# Patient Record
Sex: FEMALE | Race: OTHER | HISPANIC OR LATINO | Employment: OTHER | URBAN - METROPOLITAN AREA
[De-identification: names, ages, dates, MRNs, and addresses within clinical notes are randomized per-mention and may not be internally consistent; named-entity substitution may affect disease eponyms.]

---

## 2017-05-04 ENCOUNTER — APPOINTMENT (OUTPATIENT)
Dept: ADMISSIONS | Facility: MEDICAL CENTER | Age: 72
End: 2017-05-04
Attending: NEUROLOGICAL SURGERY
Payer: MEDICARE

## 2017-05-04 RX ORDER — ATORVASTATIN CALCIUM 10 MG/1
10 TABLET, FILM COATED ORAL NIGHTLY
COMMUNITY

## 2017-05-09 ENCOUNTER — HOSPITAL ENCOUNTER (OUTPATIENT)
Facility: MEDICAL CENTER | Age: 72
End: 2017-05-10
Attending: NEUROLOGICAL SURGERY | Admitting: NEUROLOGICAL SURGERY
Payer: MEDICARE

## 2017-05-09 ENCOUNTER — APPOINTMENT (OUTPATIENT)
Dept: RADIOLOGY | Facility: MEDICAL CENTER | Age: 72
End: 2017-05-09
Attending: NEUROLOGICAL SURGERY
Payer: MEDICARE

## 2017-05-09 PROBLEM — M51.36 DEGENERATION OF LUMBAR INTERVERTEBRAL DISC: Status: ACTIVE | Noted: 2017-05-09

## 2017-05-09 PROCEDURE — 700101 HCHG RX REV CODE 250

## 2017-05-09 PROCEDURE — 160009 HCHG ANES TIME/MIN: Performed by: NEUROLOGICAL SURGERY

## 2017-05-09 PROCEDURE — 700112 HCHG RX REV CODE 229: Performed by: PHYSICIAN ASSISTANT

## 2017-05-09 PROCEDURE — 501838 HCHG SUTURE GENERAL: Performed by: NEUROLOGICAL SURGERY

## 2017-05-09 PROCEDURE — 700101 HCHG RX REV CODE 250: Performed by: PHYSICIAN ASSISTANT

## 2017-05-09 PROCEDURE — 700111 HCHG RX REV CODE 636 W/ 250 OVERRIDE (IP)

## 2017-05-09 PROCEDURE — 500389 HCHG DRAIN, RESERVOIR SUCT JP 100CC: Performed by: NEUROLOGICAL SURGERY

## 2017-05-09 PROCEDURE — 160035 HCHG PACU - 1ST 60 MINS PHASE I: Performed by: NEUROLOGICAL SURGERY

## 2017-05-09 PROCEDURE — 700102 HCHG RX REV CODE 250 W/ 637 OVERRIDE(OP): Performed by: PHYSICIAN ASSISTANT

## 2017-05-09 PROCEDURE — 500885 HCHG PACK, JACKSON TABLE: Performed by: NEUROLOGICAL SURGERY

## 2017-05-09 PROCEDURE — 160036 HCHG PACU - EA ADDL 30 MINS PHASE I: Performed by: NEUROLOGICAL SURGERY

## 2017-05-09 PROCEDURE — 502626 HCHG SURGIFLO HEMOSTATIC MATRIX 6ML: Performed by: NEUROLOGICAL SURGERY

## 2017-05-09 PROCEDURE — 72020 X-RAY EXAM OF SPINE 1 VIEW: CPT

## 2017-05-09 PROCEDURE — A9270 NON-COVERED ITEM OR SERVICE: HCPCS | Performed by: PHYSICIAN ASSISTANT

## 2017-05-09 PROCEDURE — 160041 HCHG SURGERY MINUTES - EA ADDL 1 MIN LEVEL 4: Performed by: NEUROLOGICAL SURGERY

## 2017-05-09 PROCEDURE — 502240 HCHG MISC OR SUPPLY RC 0272: Performed by: NEUROLOGICAL SURGERY

## 2017-05-09 PROCEDURE — 700111 HCHG RX REV CODE 636 W/ 250 OVERRIDE (IP): Performed by: PHYSICIAN ASSISTANT

## 2017-05-09 PROCEDURE — 160029 HCHG SURGERY MINUTES - 1ST 30 MINS LEVEL 4: Performed by: NEUROLOGICAL SURGERY

## 2017-05-09 PROCEDURE — G0378 HOSPITAL OBSERVATION PER HR: HCPCS

## 2017-05-09 PROCEDURE — 500375 HCHG DRAIN, J-P ROUND 10FR: Performed by: NEUROLOGICAL SURGERY

## 2017-05-09 PROCEDURE — 160048 HCHG OR STATISTICAL LEVEL 1-5: Performed by: NEUROLOGICAL SURGERY

## 2017-05-09 PROCEDURE — 160002 HCHG RECOVERY MINUTES (STAT): Performed by: NEUROLOGICAL SURGERY

## 2017-05-09 PROCEDURE — 96374 THER/PROPH/DIAG INJ IV PUSH: CPT | Mod: XU

## 2017-05-09 RX ORDER — AMOXICILLIN 250 MG
1 CAPSULE ORAL NIGHTLY
Status: DISCONTINUED | OUTPATIENT
Start: 2017-05-09 | End: 2017-05-10 | Stop reason: HOSPADM

## 2017-05-09 RX ORDER — MORPHINE SULFATE 4 MG/ML
2-4 INJECTION, SOLUTION INTRAMUSCULAR; INTRAVENOUS
Status: DISCONTINUED | OUTPATIENT
Start: 2017-05-09 | End: 2017-05-10 | Stop reason: HOSPADM

## 2017-05-09 RX ORDER — BUPIVACAINE HYDROCHLORIDE AND EPINEPHRINE 5; 5 MG/ML; UG/ML
INJECTION, SOLUTION EPIDURAL; INTRACAUDAL; PERINEURAL
Status: DISCONTINUED | OUTPATIENT
Start: 2017-05-09 | End: 2017-05-09 | Stop reason: HOSPADM

## 2017-05-09 RX ORDER — CEFAZOLIN SODIUM 2 G/100ML
2 INJECTION, SOLUTION INTRAVENOUS EVERY 8 HOURS
Status: COMPLETED | OUTPATIENT
Start: 2017-05-09 | End: 2017-05-10

## 2017-05-09 RX ORDER — DOCUSATE SODIUM 100 MG/1
100 CAPSULE, LIQUID FILLED ORAL 2 TIMES DAILY
Status: DISCONTINUED | OUTPATIENT
Start: 2017-05-09 | End: 2017-05-10 | Stop reason: HOSPADM

## 2017-05-09 RX ORDER — DEXTROSE MONOHYDRATE, SODIUM CHLORIDE, AND POTASSIUM CHLORIDE 50; 1.49; 4.5 G/1000ML; G/1000ML; G/1000ML
INJECTION, SOLUTION INTRAVENOUS CONTINUOUS
Status: DISCONTINUED | OUTPATIENT
Start: 2017-05-09 | End: 2017-05-10 | Stop reason: HOSPADM

## 2017-05-09 RX ORDER — OXYCODONE HYDROCHLORIDE 10 MG/1
5 TABLET ORAL EVERY 4 HOURS PRN
Status: DISCONTINUED | OUTPATIENT
Start: 2017-05-09 | End: 2017-05-10 | Stop reason: HOSPADM

## 2017-05-09 RX ORDER — DIPHENHYDRAMINE HCL 25 MG
25 TABLET ORAL EVERY 6 HOURS PRN
Status: DISCONTINUED | OUTPATIENT
Start: 2017-05-09 | End: 2017-05-10 | Stop reason: HOSPADM

## 2017-05-09 RX ORDER — CEPHALEXIN 500 MG/1
500 CAPSULE ORAL EVERY 6 HOURS
Status: DISCONTINUED | OUTPATIENT
Start: 2017-05-10 | End: 2017-05-10 | Stop reason: HOSPADM

## 2017-05-09 RX ORDER — OXYCODONE HYDROCHLORIDE 10 MG/1
5-10 TABLET ORAL EVERY 4 HOURS PRN
Status: DISCONTINUED | OUTPATIENT
Start: 2017-05-09 | End: 2017-05-09

## 2017-05-09 RX ORDER — DIPHENHYDRAMINE HYDROCHLORIDE 50 MG/ML
25 INJECTION INTRAMUSCULAR; INTRAVENOUS EVERY 6 HOURS PRN
Status: DISCONTINUED | OUTPATIENT
Start: 2017-05-09 | End: 2017-05-10 | Stop reason: HOSPADM

## 2017-05-09 RX ORDER — LIDOCAINE HYDROCHLORIDE 10 MG/ML
INJECTION, SOLUTION INFILTRATION; PERINEURAL
Status: COMPLETED
Start: 2017-05-09 | End: 2017-05-09

## 2017-05-09 RX ORDER — OXYCODONE HYDROCHLORIDE 5 MG/1
2.5 TABLET ORAL
Status: DISCONTINUED | OUTPATIENT
Start: 2017-05-09 | End: 2017-05-10 | Stop reason: HOSPADM

## 2017-05-09 RX ORDER — BISACODYL 10 MG
10 SUPPOSITORY, RECTAL RECTAL
Status: DISCONTINUED | OUTPATIENT
Start: 2017-05-09 | End: 2017-05-10 | Stop reason: HOSPADM

## 2017-05-09 RX ORDER — LIDOCAINE AND PRILOCAINE 25; 25 MG/G; MG/G
1 CREAM TOPICAL
Status: DISCONTINUED | OUTPATIENT
Start: 2017-05-09 | End: 2017-05-10 | Stop reason: HOSPADM

## 2017-05-09 RX ORDER — CYCLOBENZAPRINE HCL 10 MG
10 TABLET ORAL EVERY 8 HOURS PRN
Status: DISCONTINUED | OUTPATIENT
Start: 2017-05-09 | End: 2017-05-10 | Stop reason: HOSPADM

## 2017-05-09 RX ORDER — ONDANSETRON 2 MG/ML
4 INJECTION INTRAMUSCULAR; INTRAVENOUS EVERY 4 HOURS PRN
Status: DISCONTINUED | OUTPATIENT
Start: 2017-05-09 | End: 2017-05-10 | Stop reason: HOSPADM

## 2017-05-09 RX ORDER — SODIUM CHLORIDE, SODIUM LACTATE, POTASSIUM CHLORIDE, AND CALCIUM CHLORIDE .6; .31; .03; .02 G/100ML; G/100ML; G/100ML; G/100ML
IRRIGANT IRRIGATION
Status: DISCONTINUED | OUTPATIENT
Start: 2017-05-09 | End: 2017-05-09 | Stop reason: HOSPADM

## 2017-05-09 RX ORDER — POLYETHYLENE GLYCOL 3350 17 G/17G
1 POWDER, FOR SOLUTION ORAL 2 TIMES DAILY PRN
Status: DISCONTINUED | OUTPATIENT
Start: 2017-05-09 | End: 2017-05-10 | Stop reason: HOSPADM

## 2017-05-09 RX ORDER — CALCIUM CARBONATE 500 MG/1
500 TABLET, CHEWABLE ORAL 2 TIMES DAILY WITH MEALS
Status: DISCONTINUED | OUTPATIENT
Start: 2017-05-09 | End: 2017-05-10 | Stop reason: HOSPADM

## 2017-05-09 RX ORDER — AMOXICILLIN 250 MG
1 CAPSULE ORAL
Status: DISCONTINUED | OUTPATIENT
Start: 2017-05-09 | End: 2017-05-10 | Stop reason: HOSPADM

## 2017-05-09 RX ORDER — LABETALOL HYDROCHLORIDE 5 MG/ML
10 INJECTION, SOLUTION INTRAVENOUS
Status: DISCONTINUED | OUTPATIENT
Start: 2017-05-09 | End: 2017-05-10 | Stop reason: HOSPADM

## 2017-05-09 RX ORDER — AMLODIPINE BESYLATE 2.5 MG/1
2.5 TABLET ORAL
Status: DISCONTINUED | OUTPATIENT
Start: 2017-05-10 | End: 2017-05-10 | Stop reason: HOSPADM

## 2017-05-09 RX ORDER — DIPHENHYDRAMINE HYDROCHLORIDE 50 MG/ML
12.5 INJECTION INTRAMUSCULAR; INTRAVENOUS ONCE
Status: COMPLETED | OUTPATIENT
Start: 2017-05-09 | End: 2017-05-09

## 2017-05-09 RX ORDER — DIPHENHYDRAMINE HYDROCHLORIDE 50 MG/ML
INJECTION INTRAMUSCULAR; INTRAVENOUS
Status: COMPLETED
Start: 2017-05-09 | End: 2017-05-09

## 2017-05-09 RX ORDER — LIDOCAINE HYDROCHLORIDE 10 MG/ML
0.5 INJECTION, SOLUTION INFILTRATION; PERINEURAL
Status: DISCONTINUED | OUTPATIENT
Start: 2017-05-09 | End: 2017-05-10 | Stop reason: HOSPADM

## 2017-05-09 RX ORDER — ONDANSETRON 4 MG/1
4 TABLET, ORALLY DISINTEGRATING ORAL EVERY 4 HOURS PRN
Status: DISCONTINUED | OUTPATIENT
Start: 2017-05-09 | End: 2017-05-10 | Stop reason: HOSPADM

## 2017-05-09 RX ORDER — HYDRALAZINE HYDROCHLORIDE 20 MG/ML
10 INJECTION INTRAMUSCULAR; INTRAVENOUS
Status: DISCONTINUED | OUTPATIENT
Start: 2017-05-09 | End: 2017-05-10 | Stop reason: HOSPADM

## 2017-05-09 RX ORDER — ENEMA 19; 7 G/133ML; G/133ML
1 ENEMA RECTAL
Status: DISCONTINUED | OUTPATIENT
Start: 2017-05-09 | End: 2017-05-10 | Stop reason: HOSPADM

## 2017-05-09 RX ORDER — ATORVASTATIN CALCIUM 10 MG/1
10 TABLET, FILM COATED ORAL NIGHTLY
Status: DISCONTINUED | OUTPATIENT
Start: 2017-05-09 | End: 2017-05-10 | Stop reason: HOSPADM

## 2017-05-09 RX ORDER — OXYCODONE HYDROCHLORIDE 10 MG/1
10 TABLET ORAL EVERY 4 HOURS PRN
Status: DISCONTINUED | OUTPATIENT
Start: 2017-05-09 | End: 2017-05-10 | Stop reason: HOSPADM

## 2017-05-09 RX ORDER — SODIUM CHLORIDE, SODIUM LACTATE, POTASSIUM CHLORIDE, CALCIUM CHLORIDE 600; 310; 30; 20 MG/100ML; MG/100ML; MG/100ML; MG/100ML
INJECTION, SOLUTION INTRAVENOUS CONTINUOUS
Status: DISCONTINUED | OUTPATIENT
Start: 2017-05-09 | End: 2017-05-10 | Stop reason: HOSPADM

## 2017-05-09 RX ADMIN — DOCUSATE SODIUM 100 MG: 100 CAPSULE ORAL at 20:07

## 2017-05-09 RX ADMIN — LIDOCAINE HYDROCHLORIDE: 10 INJECTION, SOLUTION INFILTRATION; PERINEURAL at 09:50

## 2017-05-09 RX ADMIN — POTASSIUM CHLORIDE, DEXTROSE MONOHYDRATE AND SODIUM CHLORIDE: 150; 5; 450 INJECTION, SOLUTION INTRAVENOUS at 17:00

## 2017-05-09 RX ADMIN — DIPHENHYDRAMINE HYDROCHLORIDE 12.5 MG: 50 INJECTION INTRAMUSCULAR; INTRAVENOUS at 14:45

## 2017-05-09 RX ADMIN — SODIUM CHLORIDE, SODIUM LACTATE, POTASSIUM CHLORIDE, CALCIUM CHLORIDE: 600; 310; 30; 20 INJECTION, SOLUTION INTRAVENOUS at 09:50

## 2017-05-09 RX ADMIN — ANTACID TABLETS 500 MG: 500 TABLET, CHEWABLE ORAL at 17:00

## 2017-05-09 RX ADMIN — ATORVASTATIN CALCIUM 10 MG: 10 TABLET, FILM COATED ORAL at 20:07

## 2017-05-09 RX ADMIN — STANDARDIZED SENNA CONCENTRATE AND DOCUSATE SODIUM 1 TABLET: 8.6; 5 TABLET, FILM COATED ORAL at 20:07

## 2017-05-09 RX ADMIN — CEFAZOLIN SODIUM 2 G: 2 INJECTION, SOLUTION INTRAVENOUS at 17:04

## 2017-05-09 ASSESSMENT — PAIN SCALES - GENERAL
PAINLEVEL_OUTOF10: 5
PAINLEVEL_OUTOF10: 1
PAINLEVEL_OUTOF10: 0
PAINLEVEL_OUTOF10: 0
PAINLEVEL_OUTOF10: 1
PAINLEVEL_OUTOF10: 0

## 2017-05-09 ASSESSMENT — LIFESTYLE VARIABLES
EVER_SMOKED: YES
ALCOHOL_USE: NO

## 2017-05-09 NOTE — IP AVS SNAPSHOT
" Home Care Instructions                                                                                                                  Name:Yas Ortiz  Medical Record Number:[id number]  CSN: [id number]    YOB: 1945   Age: 72 y.o.  Sex: female  HT:1.549 m (5' 1\") WT: 70.6 kg (155 lb 10.3 oz)          Admit Date: 5/9/2017     Discharge Date:   Today's Date: 5/10/2017  Attending Doctor:  Gerardo Johnson M.D.                  Allergies:  [place] butter; Lactose; Latex; Tylenol-codeine; and Celery oil            Discharge Instructions       Discharge Instructions    Discharged to home by car with relative. Discharged via wheelchair, hospital escort: Yes.  Special equipment needed: Walker    Be sure to schedule a follow-up appointment with your primary care doctor or any specialists as instructed.     Discharge Plan:   Diet Plan: Discussed  Activity Level: Discussed  Confirmed Follow up Appointment: Appointment Scheduled  Confirmed Symptoms Management: Discussed  Medication Reconciliation Updated: Yes  Influenza Vaccine Indication: Not indicated: Previously immunized this influenza season and > 8 years of age    I understand that a diet low in cholesterol, fat, and sodium is recommended for good health. Unless I have been given specific instructions below for another diet, I accept this instruction as my diet prescription.   Other diet: none    Special Instructions:   Take pain medications as prescribed   No driving   Keep wound dry until Friday   Return to ER with chest pain, shortness of breath, leg/arm swelling    · Is patient discharged on Warfarin / Coumadin?   No     · Is patient Post Blood Transfusion?  No    Laminectomy, Care After  Refer to this sheet in the next few weeks. These instructions provide you with information about caring for yourself after your procedure. Your health care provider may also give you more specific instructions. Your treatment has been planned according to current medical " practices, but problems sometimes occur. Call your health care provider if you have any problems or questions after your procedure.  WHAT TO EXPECT AFTER THE PROCEDURE  After your procedure, it is common to have some pain around the incision.  HOME CARE INSTRUCTIONS  Bathing  · You may shower after the bandage (dressing) has been removed or as directed by your health care provider.  · Do not take baths, swim, or use a hot tub for 2 weeks or until your incision has healed completely. Check with your health care provider before you start any of these activities.  Incision Care  · There are many different ways to close and cover an incision, including stitches, skin glue, and adhesive strips. Follow instructions from your health care provider about:  ¨ Incision care.  ¨ Dressing changes and removal.  ¨ Incision closure removal.  · Check your incision area every day for signs of infection. Watch for:  ¨ Redness, swelling, or pain.  ¨ Fluid, blood, or pus.  Activity  · Return to your normal activities as directed by your health care provider. Ask your health care provider what activities are safe for you.  · Perform breathing exercises if directed by your health care provider.  · Avoid bending or twisting at your waist. Always bend at your knees.  · Do not sit for more than 20-30 minutes at a time. Lie down or walk between periods of sitting.  · Do not lift anything that is heavier than 10 lb (4.5 kg).  · Do not drive for 2 weeks after your procedure or as directed by your health care provider. You may be a passenger for 20-30 minute trips.  · Do not drive or operate heavy machinery while taking pain medicine.  General Instructions  · Take medicines only as directed by your health care provider.  · Keep all follow-up visits as directed by your health care provider. This is important.  SEEK MEDICAL CARE IF:  · You have redness, swelling, or increasing pain in the area of your incision.  · You notice a bad smell coming  from the incision or dressing.  · You are not able to return to activities or perform exercises as instructed by your health care provider.  SEEK IMMEDIATE MEDICAL CARE IF:  · You develop a rash.  · You have fluid, blood, or pus coming from your incision.  · You have chills or a fever.  · You have episodes of dizziness or fainting while standing.  · You develop shortness of breath or you have difficulty breathing.  · You lose the ability to control your bladder or bowel.  · You become weak.  · You cannot use your legs.     This information is not intended to replace advice given to you by your health care provider. Make sure you discuss any questions you have with your health care provider.     Document Released: 07/07/2006 Document Revised: 05/03/2016 Document Reviewed: 12/14/2015  [employer] Interactive Patient Education ©2016 [employer]    Depression / Suicide Risk    As you are discharged from this [facility] facility, it is important to learn how to keep safe from harming yourself.    Recognize the warning signs:  · Abrupt changes in personality, positive or negative- including increase in energy   · Giving away possessions  · Change in eating patterns- significant weight changes-  positive or negative  · Change in sleeping patterns- unable to sleep or sleeping all the time   · Unwillingness or inability to communicate  · Depression  · Unusual sadness, discouragement and loneliness  · Talk of wanting to die  · Neglect of personal appearance   · Rebelliousness- reckless behavior  · Withdrawal from people/activities they love  · Confusion- inability to concentrate     If you or a loved one observes any of these behaviors or has concerns about self-harm, here's what you can do:  · Talk about it- your feelings and reasons for harming yourself  · Remove any means that you might use to hurt yourself (examples: pills, rope, extension cords, firearm)  · Get professional help from the community (Mental Health,  Substance Abuse, psychological counseling)  · Do not be alone:Call your Safe Contact- someone whom you trust who will be there for you.  · Call your local CRISIS HOTLINE 252-8320 or 345-194-3236  · Call your local Children's Mobile Crisis Response Team [place] (334) 127-5811 or www.GroupCharger  · Call the toll free National Suicide Prevention Hotlines   · National Suicide Prevention Lifeline 815-290-VWEO (8184)  · [facility] Hope Line Network 800-SUICIDE (283-6229)        Follow-up Information     1. Follow up with Gerardo Johnson M.D..    Specialty:  Neurosurgery    Contact information    [address]  [facility] [id number] [phone]           Discharge Medication Instructions:    Below are the medications your physician expects you to take upon discharge:    Review all your home medications and newly ordered medications with your doctor and/or pharmacist. Follow medication instructions as directed by your doctor and/or pharmacist.    Please keep your medication list with you and share with your physician.               Medication List      START taking these medications        Instructions    Morning Afternoon Evening Bedtime    cephALEXin 500 MG Caps   Last time this was given:  500 mg on 5/10/2017 11:07 AM   Commonly known as:  KEFLEX   Next Dose Due:  6:00 pm        Take 1 Cap by mouth every 6 hours.   Dose:  500 mg                        cyclobenzaprine 10 MG Tabs   Last time this was given:  10 mg on 5/10/2017 11:07 AM   Commonly known as:  FLEXERIL   Next Dose Due:  7:00 pm        Take 1 Tab by mouth every 8 hours as needed for Muscle Spasms.   Dose:  10 mg                        oxycodone immediate-release 5 MG Tabs   Last time this was given:  10 mg on 5/10/2017 12:50 PM   Commonly known as:  ROXICODONE   Next Dose Due:  4:50 pm        Take 1 Tab by mouth every four hours as needed for Severe Pain (Severe Pain. Severe Pain (NRS Pain Scale 7-10; CPOT Pain Scale 6-8)).   Dose:  5 mg                             CONTINUE taking these medications        Instructions    Morning Afternoon Evening Bedtime    amlodipine 2.5 MG Tabs   Last time this was given:  2.5 mg on 5/10/2017  7:52 AM   Commonly known as:  NORVASC        Take 2.5 mg by mouth. Indications: High Blood Pressure   Dose:  2.5 mg                        atorvastatin 10 MG Tabs   Last time this was given:  10 mg on 5/9/2017  8:07 PM   Commonly known as:  LIPITOR        Take 10 mg by mouth every evening.   Dose:  10 mg                             Where to Get Your Medications      Information about where to get these medications is not yet available     ! Ask your nurse or doctor about these medications    - cephALEXin 500 MG Caps  - cyclobenzaprine 10 MG Tabs  - oxycodone immediate-release 5 MG Tabs            Instructions           Diet / Nutrition:    Follow any diet instructions given to you by your doctor or the dietician, including how much salt (sodium) you are allowed each day.    If you are overweight, talk to your doctor about a weight reduction plan.    Activity:    Remain physically active following your doctor's instructions about exercise and activity.    Rest often.     Any time you become even a little tired or short of breath, SIT DOWN and rest.    Worsening Symptoms:    Report any of the following signs and symptoms to the doctor's office immediately:    *Pain of jaw, arm, or neck  *Chest pain not relieved by medication                               *Dizziness or loss of consciousness  *Difficulty breathing even when at rest   *More tired than usual                                       *Bleeding drainage or swelling of surgical site  *Swelling of feet, ankles, legs or stomach                 *Fever (>100ºF)  *Pink or blood tinged sputum  *Weight gain (3lbs/day or 5lbs /week)           *Shock from internal defibrillator (if applicable)  *Palpitations or irregular heartbeats                *Cool and/or numb extremities    Stroke  Awareness    Common Risk Factors for Stroke include:    Age  Atrial Fibrillation  Carotid Artery Stenosis  Diabetes Mellitus  Excessive alcohol consumption  High blood pressure  Overweight   Physical inactivity  Smoking    Warning signs and symptoms of a stroke include:    *Sudden numbness or weakness of the face, arm or leg (especially on one side of the body).  *Sudden confusion, trouble speaking or understanding.  *Sudden trouble seeing in one or both eyes.  *Sudden trouble walking, dizziness, loss of balance or coordination.Sudden severe headache with no known cause.    It is very important to get treatment quickly when a stroke occurs. If you experience any of the above warning signs, call 911 immediately.                   Disclaimer         Quit Smoking / Tobacco Use:    I understand the use of any tobacco products increases my chance of suffering from future heart disease or stroke and could cause other illnesses which may shorten my life. Quitting the use of tobacco products is the single most important thing I can do to improve my health. For further information on smoking / tobacco cessation call a Toll Free Quit Line at 1-439.802.4906 (*National Cancer [employer]) or 1-739.237.5015 (American Lung Association) or you can access the web based program at www.lungusa.org.    [place] Tobacco Users Help Line:  (405) 794-1778       Toll Free: 1-907.857.4694  Quit Tobacco Program [facility] (215)420-2629    Crisis Hotline:    [place] Crisis Hotline:  0-322-QWTNNDE or 1-728.839.1743    [place] Crisis Hotline:    1-832.618.9350 or 666-156-0150    Discharge Survey:   Thank you for choosing [facility]. We hope we did everything we could to make your hospital stay a pleasant one. You may be receiving a phone survey and we would appreciate your time and participation in answering the questions. Your input is very valuable to us in our efforts to improve our service to our patients and their  families.        My signature on this form indicates that:    1. I have reviewed and understand the above information.  2. My questions regarding this information have been answered to my satisfaction.  3. I have formulated a plan with my discharge nurse to obtain my prescribed medications for home.                  Disclaimer         __________________________________                     __________       ________                       Patient Signature                                                 Date                    Time

## 2017-05-09 NOTE — OR SURGEON
Immediate Post-Operative Note      PreOp Diagnosis: Lumbar stenosis L3-S1, Foraminal stenosis, Lumbar Radiculopathies L3, 4,5    PostOp Diagnosis: same    Procedure(s):   MINIMALLY INVASIVE INDU L3-S1 LAMINOTOMIES VIA RT SIDED UNI-PORTAL - Wound Class: Clean with Drain    Surgeon(s):  Gerardo Johnson M.D.    Anesthesiologist/Type of Anesthesia:  Anesthesiologist: Leif Fisher D.O./General    Surgical Staff:  Assistant: Ivonne Rangel PA-C  Circulator: Mary Kate Morgan R.N.  Relief Circulator: Jocelyne Mea R.N.  Scrub Person: Flaca Burnett    Specimen: None    Estimated Blood Loss: 75ccs    Findings: see op report    Complications: none        5/9/2017 1:45 PM Ivonne Rangel

## 2017-05-09 NOTE — IP AVS SNAPSHOT
5/10/2017    Yas Ortiz  [address]  [place] [address]    Dear Yas:    [facility] wants to ensure your discharge home is safe and you or your loved ones have had all of your questions answered regarding your care after you leave the hospital.    Below is a list of resources and contact information should you have any questions regarding your hospital stay, follow-up instructions, or active medical symptoms.    Questions or Concerns Regarding… Contact   Medical Questions Related to Your Discharge  (7 days a week, 8am-5pm) Contact a Nurse Care Coordinator   [phone]   Medical Questions Not Related to Your Discharge  (24 hours a day / 7 days a week)  Contact the Nurse Health Line   [phone]    Medications or Discharge Instructions Refer to your discharge packet   or contact your [facility] Primary Care Provider   [phone]   Follow-up Appointment(s) Schedule your appointment via Matchmove   or contact Scheduling [phone]   Billing Review your statement via Matchmove  or contact Billing [phone]   Medical Records Review your records via Matchmove   or contact Medical Records [phone]     You may receive a telephone call within two days of discharge. This call is to make certain you understand your discharge instructions and have the opportunity to have any questions answered. You can also easily access your medical information, test results and upcoming appointments via the Matchmove free online health management tool. You can learn more and sign up at "[employer]/Matchmove. For assistance setting up your Matchmove account, please call [phone].    Once again, we want to ensure your discharge home is safe and that you have a clear understanding of any next steps in your care. If you have any questions or concerns, please do not hesitate to contact us, we are here for you. Thank you for choosing [facility] for your healthcare needs.    Sincerely,    Your [facility] Healthcare Team

## 2017-05-09 NOTE — IP AVS SNAPSHOT
" <p align=\"LEFT\"><IMG SRC=\"//[email or web address]$/[email or web address]\" alt=\"Image\" WIDTH=\"50%\" HEIGHT=\"200\" BORDER=\"\"></p>                   Name:Yas Ortiz  Medical Record Number:[id number]  CSN: [id number]    YOB: 1945   Age: 72 y.o.  Sex: female  HT:1.549 m (5' 1\") WT: 70.6 kg (155 lb 10.3 oz)          Admit Date: 5/9/2017     Discharge Date:   Today's Date: 5/10/2017  Attending Doctor:  Gerardo Johnson M.D.                  Allergies:  [place] butter; Lactose; Latex; Tylenol-codeine; and Celery oil          Follow-up Information     1. Follow up with Gerardo Johnson M.D..    Specialty:  Neurosurgery    Contact information    [address]  [facility] [address]  [phone]           Medication List      Take these Medications        Instructions    amlodipine 2.5 MG Tabs   Commonly known as:  NORVASC    Take 2.5 mg by mouth. Indications: High Blood Pressure   Dose:  2.5 mg       atorvastatin 10 MG Tabs   Commonly known as:  LIPITOR    Take 10 mg by mouth every evening.   Dose:  10 mg       cephALEXin 500 MG Caps   Commonly known as:  KEFLEX    Take 1 Cap by mouth every 6 hours.   Dose:  500 mg       cyclobenzaprine 10 MG Tabs   Commonly known as:  FLEXERIL    Take 1 Tab by mouth every 8 hours as needed for Muscle Spasms.   Dose:  10 mg       oxycodone immediate-release 5 MG Tabs   Commonly known as:  ROXICODONE    Take 1 Tab by mouth every four hours as needed for Severe Pain (Severe Pain. Severe Pain (NRS Pain Scale 7-10; CPOT Pain Scale 6-8)).   Dose:  5 mg         "

## 2017-05-09 NOTE — IP AVS SNAPSHOT
Graphenea Access Code: [email or web address]  Expires: 6/3/2017  2:09 PM    Graphenea  A secure, online tool to manage your health information     [employer]’s Graphenea® is a secure, online tool that connects you to your personalized health information from the privacy of your home -- day or night - making it very easy for you to manage your healthcare. Once the activation process is completed, you can even access your medical information using the Graphenea crow, which is available for free in the Apple Crow store or Google Play store.     Graphenea provides the following levels of access (as shown below):   My Chart Features   [facility] Primary Care Doctor [facility]  Specialists [facility]  Urgent  Care Non-[facility]  Primary Care  Doctor   Email your healthcare team securely and privately 24/7 X X X X   Manage appointments: schedule your next appointment; view details of past/upcoming appointments X      Request prescription refills. X      View recent personal medical records, including lab and immunizations X X X X   View health record, including health history, allergies, medications X X X X   Read reports about your outpatient visits, procedures, consult and ER notes X X X X   See your discharge summary, which is a recap of your hospital and/or ER visit that includes your diagnosis, lab results, and care plan. X X       How to register for Graphenea:  1. Go to  [email or web address].  2. Click on the Sign Up Now box, which takes you to the New Member Sign Up page. You will need to provide the following information:  a. Enter your Graphenea Access Code exactly as it appears at the top of this page. (You will not need to use this code after you’ve completed the sign-up process. If you do not sign up before the expiration date, you must request a new code.)   b. Enter your date of birth.   c. Enter your home email address.   d. Click Submit, and follow the next screen’s instructions.  3. Create a Graphenea ID. This will be your Graphenea  login ID and cannot be changed, so think of one that is secure and easy to remember.  4. Create a YOGITECH password. You can change your password at any time.  5. Enter your Password Reset Question and Answer. This can be used at a later time if you forget your password.   6. Enter your e-mail address. This allows you to receive e-mail notifications when new information is available in YOGITECH.  7. Click Sign Up. You can now view your health information.    For assistance activating your YOGITECH account, call [phone]

## 2017-05-10 VITALS
TEMPERATURE: 97 F | HEIGHT: 61 IN | SYSTOLIC BLOOD PRESSURE: 129 MMHG | BODY MASS INDEX: 29.39 KG/M2 | HEART RATE: 70 BPM | OXYGEN SATURATION: 95 % | WEIGHT: 155.65 LBS | RESPIRATION RATE: 18 BRPM | DIASTOLIC BLOOD PRESSURE: 69 MMHG

## 2017-05-10 LAB
ERYTHROCYTE [DISTWIDTH] IN BLOOD BY AUTOMATED COUNT: 39.6 FL (ref 35.9–50)
HCT VFR BLD AUTO: 37.6 % (ref 37–47)
HGB BLD-MCNC: 12.8 G/DL (ref 12–16)
MCH RBC QN AUTO: 29.3 PG (ref 27–33)
MCHC RBC AUTO-ENTMCNC: 34 G/DL (ref 33.6–35)
MCV RBC AUTO: 86 FL (ref 81.4–97.8)
PLATELET # BLD AUTO: 239 K/UL (ref 164–446)
PMV BLD AUTO: 8.7 FL (ref 9–12.9)
RBC # BLD AUTO: 4.37 M/UL (ref 4.2–5.4)
WBC # BLD AUTO: 12.3 K/UL (ref 4.8–10.8)

## 2017-05-10 PROCEDURE — 97165 OT EVAL LOW COMPLEX 30 MIN: CPT | Mod: XE

## 2017-05-10 PROCEDURE — A9270 NON-COVERED ITEM OR SERVICE: HCPCS | Performed by: PHYSICIAN ASSISTANT

## 2017-05-10 PROCEDURE — G8988 SELF CARE GOAL STATUS: HCPCS | Mod: CI

## 2017-05-10 PROCEDURE — G8980 MOBILITY D/C STATUS: HCPCS | Mod: CI

## 2017-05-10 PROCEDURE — 700102 HCHG RX REV CODE 250 W/ 637 OVERRIDE(OP): Performed by: PHYSICIAN ASSISTANT

## 2017-05-10 PROCEDURE — 36415 COLL VENOUS BLD VENIPUNCTURE: CPT

## 2017-05-10 PROCEDURE — 85027 COMPLETE CBC AUTOMATED: CPT

## 2017-05-10 PROCEDURE — G8979 MOBILITY GOAL STATUS: HCPCS | Mod: CI

## 2017-05-10 PROCEDURE — 700111 HCHG RX REV CODE 636 W/ 250 OVERRIDE (IP): Performed by: PHYSICIAN ASSISTANT

## 2017-05-10 PROCEDURE — 700101 HCHG RX REV CODE 250: Performed by: PHYSICIAN ASSISTANT

## 2017-05-10 PROCEDURE — 700112 HCHG RX REV CODE 229: Performed by: PHYSICIAN ASSISTANT

## 2017-05-10 PROCEDURE — G8987 SELF CARE CURRENT STATUS: HCPCS | Mod: CJ

## 2017-05-10 PROCEDURE — G8978 MOBILITY CURRENT STATUS: HCPCS | Mod: CI

## 2017-05-10 PROCEDURE — 96376 TX/PRO/DX INJ SAME DRUG ADON: CPT

## 2017-05-10 PROCEDURE — 97162 PT EVAL MOD COMPLEX 30 MIN: CPT

## 2017-05-10 PROCEDURE — G0378 HOSPITAL OBSERVATION PER HR: HCPCS

## 2017-05-10 RX ORDER — CEPHALEXIN 500 MG/1
500 CAPSULE ORAL EVERY 6 HOURS
Qty: 20 CAP | Refills: 0 | Status: SHIPPED | OUTPATIENT
Start: 2017-05-10 | End: 2021-04-07

## 2017-05-10 RX ORDER — CYCLOBENZAPRINE HCL 10 MG
10 TABLET ORAL EVERY 8 HOURS PRN
Qty: 50 TAB | Refills: 0 | Status: SHIPPED | OUTPATIENT
Start: 2017-05-10 | End: 2021-04-07

## 2017-05-10 RX ORDER — OXYCODONE HYDROCHLORIDE 5 MG/1
5 TABLET ORAL EVERY 4 HOURS PRN
Qty: 80 TAB | Refills: 0 | Status: SHIPPED | OUTPATIENT
Start: 2017-05-10 | End: 2021-04-07

## 2017-05-10 RX ADMIN — ANTACID TABLETS 500 MG: 500 TABLET, CHEWABLE ORAL at 07:51

## 2017-05-10 RX ADMIN — CEPHALEXIN 500 MG: 500 CAPSULE ORAL at 05:34

## 2017-05-10 RX ADMIN — AMLODIPINE BESYLATE 2.5 MG: 2.5 TABLET ORAL at 07:52

## 2017-05-10 RX ADMIN — CEFAZOLIN SODIUM 2 G: 2 INJECTION, SOLUTION INTRAVENOUS at 01:47

## 2017-05-10 RX ADMIN — CEPHALEXIN 500 MG: 500 CAPSULE ORAL at 11:07

## 2017-05-10 RX ADMIN — OXYCODONE HYDROCHLORIDE 10 MG: 10 TABLET ORAL at 03:53

## 2017-05-10 RX ADMIN — OXYCODONE HYDROCHLORIDE 10 MG: 10 TABLET ORAL at 12:50

## 2017-05-10 RX ADMIN — DOCUSATE SODIUM 100 MG: 100 CAPSULE ORAL at 07:51

## 2017-05-10 RX ADMIN — CYCLOBENZAPRINE HYDROCHLORIDE 10 MG: 10 TABLET, FILM COATED ORAL at 11:07

## 2017-05-10 RX ADMIN — OXYCODONE HYDROCHLORIDE 10 MG: 10 TABLET ORAL at 07:51

## 2017-05-10 RX ADMIN — POTASSIUM CHLORIDE, DEXTROSE MONOHYDRATE AND SODIUM CHLORIDE: 150; 5; 450 INJECTION, SOLUTION INTRAVENOUS at 06:10

## 2017-05-10 ASSESSMENT — GAIT ASSESSMENTS
ASSISTIVE DEVICE: FRONT WHEEL WALKER
DEVIATION: BRADYKINETIC
GAIT LEVEL OF ASSIST: SUPERVISED
DISTANCE (FEET): 200

## 2017-05-10 ASSESSMENT — PAIN SCALES - GENERAL
PAINLEVEL_OUTOF10: 8
PAINLEVEL_OUTOF10: 4
PAINLEVEL_OUTOF10: ASSUMED PAIN PRESENT
PAINLEVEL_OUTOF10: 4
PAINLEVEL_OUTOF10: 8
PAINLEVEL_OUTOF10: 8

## 2017-05-10 ASSESSMENT — ACTIVITIES OF DAILY LIVING (ADL): TOILETING: INDEPENDENT

## 2017-05-10 ASSESSMENT — ENCOUNTER SYMPTOMS: BACK PAIN: 1

## 2017-05-10 NOTE — THERAPY
"Occupational Therapy Evaluation completed.   Functional Status:  CGA w/log roll OOB, CGA for initial sit>stand c/o pain w/o AD, SBA w/sit>Stand w/fww, CGA w/grooming standing at sink, pt had increasing difficulty w/applying spinal precautions during ADL's, CGA w/toilet txf, remained up in chair alarm on call light w/in reach educated on spinal precautions will benefit from reinforcement   Plan of Care: Will benefit from Occupational Therapy 4 times per week  Discharge Recommendations:  Equipment: Will Continue to Assess for Equipment Needs. Post-acute therapy Discharge to home with outpatient or home health for additional skilled therapy services.    See \"Rehab Therapy-Acute\" Patient Summary Report for complete documentation.    "

## 2017-05-10 NOTE — THERAPY
"Physical Therapy Evaluation completed.   Bed Mobility:   Supine<>sit: sleeping in recliner   Transfers:  Sit<>stand: stand by assist with FWW   Gait: supervision  with Front-Wheel Walker       Plan of Care: Patient with no further skilled PT needs in the acute care setting at this time  Discharge Recommendations: Equipment: Front-Wheel Walker.     Pt presents with impaired activity tolerance s/p MIS L3-S1. Pt is most limited by normal acute pain, improving with ambulation, limiting sitting abilities. Pt feels confident in her abilities to return home at this time with care of her ex . Has performed stairs and educated on acute mobility management. Pt needs to clear tub transfers with OT before d/c home; however, no acute PT needs identified at this time. Please continue to promote frequent mobility.     See \"Rehab Therapy-Acute\" Patient Summary Report for complete documentation.     "

## 2017-05-10 NOTE — DISCHARGE INSTRUCTIONS
Discharge Instructions    Discharged to home by car with relative. Discharged via wheelchair, hospital escort: Yes.  Special equipment needed: Walker    Be sure to schedule a follow-up appointment with your primary care doctor or any specialists as instructed.     Discharge Plan:   Diet Plan: Discussed  Activity Level: Discussed  Confirmed Follow up Appointment: Appointment Scheduled  Confirmed Symptoms Management: Discussed  Medication Reconciliation Updated: Yes  Influenza Vaccine Indication: Not indicated: Previously immunized this influenza season and > 8 years of age    I understand that a diet low in cholesterol, fat, and sodium is recommended for good health. Unless I have been given specific instructions below for another diet, I accept this instruction as my diet prescription.   Other diet: none    Special Instructions:   Take pain medications as prescribed   No driving   Keep wound dry until Friday   Return to ER with chest pain, shortness of breath, leg/arm swelling    · Is patient discharged on Warfarin / Coumadin?   No     · Is patient Post Blood Transfusion?  No    Laminectomy, Care After  Refer to this sheet in the next few weeks. These instructions provide you with information about caring for yourself after your procedure. Your health care provider may also give you more specific instructions. Your treatment has been planned according to current medical practices, but problems sometimes occur. Call your health care provider if you have any problems or questions after your procedure.  WHAT TO EXPECT AFTER THE PROCEDURE  After your procedure, it is common to have some pain around the incision.  HOME CARE INSTRUCTIONS  Bathing  · You may shower after the bandage (dressing) has been removed or as directed by your health care provider.  · Do not take baths, swim, or use a hot tub for 2 weeks or until your incision has healed completely. Check with your health care provider before you start any of these  activities.  Incision Care  · There are many different ways to close and cover an incision, including stitches, skin glue, and adhesive strips. Follow instructions from your health care provider about:  ¨ Incision care.  ¨ Dressing changes and removal.  ¨ Incision closure removal.  · Check your incision area every day for signs of infection. Watch for:  ¨ Redness, swelling, or pain.  ¨ Fluid, blood, or pus.  Activity  · Return to your normal activities as directed by your health care provider. Ask your health care provider what activities are safe for you.  · Perform breathing exercises if directed by your health care provider.  · Avoid bending or twisting at your waist. Always bend at your knees.  · Do not sit for more than 20-30 minutes at a time. Lie down or walk between periods of sitting.  · Do not lift anything that is heavier than 10 lb (4.5 kg).  · Do not drive for 2 weeks after your procedure or as directed by your health care provider. You may be a passenger for 20-30 minute trips.  · Do not drive or operate heavy machinery while taking pain medicine.  General Instructions  · Take medicines only as directed by your health care provider.  · Keep all follow-up visits as directed by your health care provider. This is important.  SEEK MEDICAL CARE IF:  · You have redness, swelling, or increasing pain in the area of your incision.  · You notice a bad smell coming from the incision or dressing.  · You are not able to return to activities or perform exercises as instructed by your health care provider.  SEEK IMMEDIATE MEDICAL CARE IF:  · You develop a rash.  · You have fluid, blood, or pus coming from your incision.  · You have chills or a fever.  · You have episodes of dizziness or fainting while standing.  · You develop shortness of breath or you have difficulty breathing.  · You lose the ability to control your bladder or bowel.  · You become weak.  · You cannot use your legs.     This information is not  intended to replace advice given to you by your health care provider. Make sure you discuss any questions you have with your health care provider.     Document Released: 07/07/2006 Document Revised: 05/03/2016 Document Reviewed: 12/14/2015  [employer] Interactive Patient Education ©2016 [employer]    Depression / Suicide Risk    As you are discharged from this [facility] facility, it is important to learn how to keep safe from harming yourself.    Recognize the warning signs:  · Abrupt changes in personality, positive or negative- including increase in energy   · Giving away possessions  · Change in eating patterns- significant weight changes-  positive or negative  · Change in sleeping patterns- unable to sleep or sleeping all the time   · Unwillingness or inability to communicate  · Depression  · Unusual sadness, discouragement and loneliness  · Talk of wanting to die  · Neglect of personal appearance   · Rebelliousness- reckless behavior  · Withdrawal from people/activities they love  · Confusion- inability to concentrate     If you or a loved one observes any of these behaviors or has concerns about self-harm, here's what you can do:  · Talk about it- your feelings and reasons for harming yourself  · Remove any means that you might use to hurt yourself (examples: pills, rope, extension cords, firearm)  · Get professional help from the community (Mental Health, Substance Abuse, psychological counseling)  · Do not be alone:Call your Safe Contact- someone whom you trust who will be there for you.  · Call your local CRISIS HOTLINE 505-5148 or 232-362-7267  · Call your local Children's Mobile Crisis Response Team [place] (225) 343-5585 or www.ANDalyze  · Call the toll free National Suicide Prevention Hotlines   · National Suicide Prevention Lifeline 168-720-WGQA (3985)  · National Hope Line Network 800-SUICIDE (970-2045)

## 2017-05-10 NOTE — PROGRESS NOTES
Received pt from PACU, she is O x4 and ambulated to the BR and bed with Stand by assist.  Her pain is 1/10.  SHe ULRICH, no c/o numbness or tingling on b LE or B UE.  RN provided admission assessment and teaching.  Pt verbalized understanding of plan of care.  Bed alarm in place.

## 2017-05-10 NOTE — CARE PLAN
Problem: Communication  Goal: The ability to communicate needs accurately and effectively will improve  Outcome: PROGRESSING AS EXPECTED  Patient uses call light to communicate needs appropriately.    Problem: Safety  Goal: Will remain free from injury  Outcome: PROGRESSING AS EXPECTED  Call light within reach, hourly rounding in practice.

## 2017-05-10 NOTE — PROGRESS NOTES
Patient states she feels ready to go home. Pain controlled with oral prn medications. MAMTA removed at this time. Discharge instructions given to pt and . All questions answered. Pt left via wheelchair with hospital escort and . All belongings taken.

## 2017-05-10 NOTE — PROGRESS NOTES
Progress Note               Author: Ivonne GUNN Juan Carlos Date & Time created: 5/10/2017  8:57 AM     Interval History:  POD1 L3-S1 MIS lamis  No leg pain  C/o back pain at incision site   Has not mobilized  Drain-125/24,     Review of Systems:  Review of Systems   Musculoskeletal: Positive for back pain.       Physical Exam:  Physical Exam   Musculoskeletal:   Motor 5/5 b/l LEs  Dressing intact  No calf tenderness         Labs:        Invalid input(s): [id number]      No results for input(s): SODIUM, POTASSIUM, CHLORIDE, CO2, BUN, CREATININE, MAGNESIUM, PHOSPHORUS, CALCIUM in the last 72 hours.  No results for input(s): ALTSGPT, ASTSGOT, ALKPHOSPHAT, TBILIRUBIN, DBILIRUBIN, GAMMAGT, AMYLASE, LIPASE, ALB, PREALBUMIN, GLUCOSE in the last 72 hours.  Recent Labs      05/10/17   0346   RBC  4.37   HEMOGLOBIN  12.8   HEMATOCRIT  37.6   PLATELETCT  239     Recent Labs      05/10/17   0346   WBC  12.3*     Hemodynamics:  Temp (24hrs), Av.6 °C (97.8 °F), Min:36.1 °C (97 °F), Max:37.6 °C (99.7 °F)  Temperature: 36.8 °C (98.2 °F)  Pulse  Av.8  Min: 67  Max: 94Heart Rate (Monitored): 83  Blood Pressure : 140/71 mmHg, NIBP: 128/66 mmHg     Respiratory:    Respiration: 18, Pulse Oximetry: 94 %           Fluids:    Intake/Output Summary (Last 24 hours) at 05/10/17 0857  Last data filed at 05/10/17 0600   Gross per 24 hour   Intake   2320 ml   Output    175 ml   Net   2145 ml     Weight: 70.6 kg (155 lb 10.3 oz)  GI/Nutrition:  Orders Placed This Encounter   Procedures   • DIET ORDER     Standing Status: Standing      Number of Occurrences: 1      Standing Expiration Date:      Order Specific Question:  Diet:     Answer:  Regular [1]     Medical Decision Making, by Problem:  Active Hospital Problems    Diagnosis   • Degeneration of lumbar intervertebral disc [M51.36]       Plan:  Mobilize PT/OT   Possibly home if mobilizing well      Core Measures

## 2017-05-10 NOTE — PROGRESS NOTES
Assumed care of pt, pt resting comfortably in bed. Denies pain at this time. A&Ox4. Bed alarm on, call bell within reach. Will continue to monitor per orders

## 2017-05-11 NOTE — DISCHARGE SUMMARY
DATE OF ADMISSION:  05/09/2017    PLANNED DATE OF DISCHARGE:  05/10/2017    SURGERY PERFORMED:  Minimally invasive L3 through S1 laminotomies via   right-sided uniportal approach performed 05/09/2017 by Dr. Gerardo Johnson.    COMPLICATIONS:  None.    REASON FOR ADMISSION:  This is a 71-year-old female with history of low back   pain and right lower extremity pain and lumbar radiculopathy and symptoms of   neurogenic claudication.  Her workup did reveal L3 through S1 lumbar spinal   stenosis.  She failed to improve with conservative measures.  She was   indicated for surgical decompression.    HOSPITAL COURSE:  Patient was admitted on date of surgery.  She underwent   surgery without complication.  After recovery, she was transferred to the   neurosciences unit.  By postoperative day 1, she is ambulating independently,   her pain is well controlled, her preoperative deficits resolved.  At this   point, she is cleared for planned discharge home later today if pain well   controlled and mobilizing, otherwise discharge will be held until tomorrow.    INSTRUCTIONS ON DISCHARGE:  Patient is to ambulate as tolerated.  She is to   avoid pushing, pulling or lifting greater than 15 pounds.  It is okay for her   to shower.  She is not to submerge the wound.  It will be okay for her to   drive in 2 weeks' time.  She is comfortable not taking narcotic pain   medications.  She does have an appointment scheduled in the office of [facility] [place] in 2 weeks' time.  She will contact   our office at any point should she have any questions or concerns.    DISCHARGE MEDICATIONS:  In addition to her usual home medications, patient is   discharged home on:  Oxycodone 5 mg 1-2 p.o. q. 4 hours p.r.n. pain, Flexeril   10 mg 1 p.o. q. 8 hours p.r.n. spasm, Keflex 500 mg, #20, 1 p.o. q.i.d. for 5   days.    All the patient's questions have been answered.  She is discharged home in   stable condition.        ____________________________________     REJI LORD    DD:  05/10/2017 09:03:43  DT:  05/11/2017 01:09:45    D#:  [id number]  Job#:  [id number]    cc: EDVIN LOUIE DO

## 2017-05-30 NOTE — OP REPORT
DATE OF SERVICE:  05/09/2017    PREOPERATIVE DIAGNOSES:  1.  Bilateral neurogenic claudication.  2.  L3-L4 and L4-L5 lumbar stenosis with bilateral recess stenosis.  3.  Right L-L5 radiculopathies.  4.  Failed conservative care including physical therapy and epidural   injections.    POSTOPERATIVE DIAGNOSES.  1.  Bilateral neurogenic claudication.  2.  L3-L4 and L4-L5 lumbar stenosis with bilateral recess stenosis.  3.  Right L-L5 radiculopathies.  4.  Failed conservative care including physical therapy and epidural   injections.    PROCEDURES PERFORMED:  Minimally invasive approach with TSI retractor system.  1.  Bilateral L3 laminotomies and foraminotomies, decompression bilateral L3   nerve roots.  2.  Bilateral L4 laminotomies and foraminotomies, decompression bilateral L4   nerve roots.  3.  Bilateral L5 laminotomies and foraminotomies, decompression bilateral L5   nerve roots.  4.  Bilateral S1 laminotomies and foraminotomies, decompression bilateral S1   nerve roots.  5.  Right L4 transpedicular approach for far lateral decompression right L4   nerve root.  6.  Right L5 transpedicular approach for far lateral decompression right L5   nerve root.  7.  Microscope for microdissection of spinal canal.    SURGEON:  Dr. Gerardo Johnson, neurosurgery-spine surgery.    ASSISTANT:  Ivonne Rangel PA-C.    ANESTHESIOLOGIST:  Leif Fisher DO.    COMPLICATIONS:  None.    ESTIMATED BLOOD LOSS:  Less than 20 mL.    PREOPERATIVE NOTE:  This is an extremely pleasant 71-year-old lady presents   with low back pain and neurogenic claudication bilaterally with more symptoms   on the right leg than the left.  The patient failed epidural injections and   physical therapy.  I discussed surgical procedure, alternatives, goals, risks   and benefits, complications in detail with the patient the above listed   procedure.  Details are well contained in the office visit notes.  I used   [facility] [place] custom 3D animations to also  assist the patient with her   understanding of surgical procedure in addition to the [facility] [place] custom 3D   animations.  The patient understood and consented to surgery.    NARRATIVE DICTATION:  Patient was brought to the operating room and placed   under endotracheal anesthesia.  She was placed prone on the operating OSI   table with care taken about the bony prominences, peripheral nerves.  Lumbar   region was prepped and draped in the usual sterile fashion.  Following   localization with cross table fluoroscopy, a small incision was made right of   midline and the fascia was incised.  I used the serial dilator tubes for   muscle splitting approach and docked on the right L4 pars.  The TSI retractor   system was then placed over this and the blades opened up allowing excellent   exposure from L4-S1 level.  I angled the retractor across the midline to allow   bilateral decompression through this right-sided uni-portal  approach.  Using   Inderas Lenin AM-8 drillbit, Kerrison rongeurs and curettes, right L4 laminotomy   and foraminotomy was completed, right L5 laminotomy and foraminotomy was   completed, right L3 laminotomy and foraminotomy was completed, right S1   laminotomy and foraminotomy was completed.  Marked facet and ligamentous   hypertrophy was undercut and removed.  The medial facets were drilled down.    The microscope was used for microdissection of spinal canal.  On the   microscope wider foraminotomy was completed over the right L3-S1 nerve roots.    I then angled across the midline and created foraminotomies over the right   foraminotomies over the left L3, L4, L5, and S1 nerve roots.  There was marked   ligamentum hypertrophy was undercut and removed.  There was still more   compression on the right side at L4-L5, hence I drilled down the right L4   pedicle for transpedicular decompression right L4 nerve root.  I also drilled   down the right L5 pedicle for transpedicular decompression right L5 nerve    root.  This required extra degree of expertise, effort and time and hence a   modifier-59 is appropriate.  The edges of bone were bone waxed.  Thrombin   Gelfoam placed in epidural gutters for hemostasis.  Cummings ball hook was   easily placed over the exiting nerve roots.  Thecal sac was nice and freed up   throughout.  I then placed an epidural catheter with Marcaine and fentanyl for   postoperative analgesia.  I infiltrated the muscle with Marcaine analgesia   and I closed the wound over a drain brought through a separate incision with 0   Vicryl deep fascia, 2-0 Vicryl deep dermal layer, Steri-Strips to skin.    Small sterile dressing was applied.  Swabs, needles, and instruments correct   x2 count.  No complications were encountered.  Patient was doing well in   recovery room and she will be discharged tomorrow morning.    INTRAOPERATIVE FINDINGS:  Severe stenosis bilaterally L3-S1 levels, which was   freed up via minimally invasive approach.  No complications were encountered.    Patient was neurologically intact in the recovery room.    Patient will follow up at [facility] [place] in 2   weeks and 6 weeks' time as arranged.       ____________________________________     DAVID HOLDEN MD    JJLUIS A / KARIE    DD:  05/29/2017 21:54:51  DT:  05/29/2017 22:12:50    D#:  [id number]  Job#:  [id number]    cc: EDVIN LOUIE DO

## 2017-08-10 ENCOUNTER — TELEPHONE (OUTPATIENT)
Dept: RADIOLOGY | Facility: MEDICAL CENTER | Age: 72
End: 2017-08-10

## 2017-08-10 ENCOUNTER — HOSPITAL ENCOUNTER (OUTPATIENT)
Dept: RADIOLOGY | Facility: MEDICAL CENTER | Age: 72
End: 2017-08-10
Attending: FAMILY MEDICINE
Payer: MEDICARE

## 2017-08-10 DIAGNOSIS — Z12.31 VISIT FOR SCREENING MAMMOGRAM: ICD-10-CM

## 2017-08-10 PROCEDURE — 77063 BREAST TOMOSYNTHESIS BI: CPT

## 2017-08-30 ENCOUNTER — HOSPITAL ENCOUNTER (OUTPATIENT)
Dept: RADIOLOGY | Facility: MEDICAL CENTER | Age: 72
End: 2017-08-30
Attending: FAMILY MEDICINE
Payer: MEDICARE

## 2017-08-30 DIAGNOSIS — R92.8 OTHER (ABNORMAL) FINDINGS ON RADIOLOGICAL EXAMINATION OF BREAST: ICD-10-CM

## 2017-08-30 PROCEDURE — G0206 DX MAMMO INCL CAD UNI: HCPCS | Mod: LT

## 2017-08-30 PROCEDURE — 76642 ULTRASOUND BREAST LIMITED: CPT | Mod: LT

## 2018-04-11 ENCOUNTER — TELEPHONE (OUTPATIENT)
Dept: RADIOLOGY | Facility: MEDICAL CENTER | Age: 73
End: 2018-04-11

## 2018-04-11 NOTE — TELEPHONE ENCOUNTER
LM ASKING PT TO CHECK-IN @ 12:45 FOR 1:00 APPT ON 4/13/18; PT ORIGINALLY SCHED INCORRECTLY @ 12:30 FOR DIAGNOSTIC/TML

## 2018-04-13 ENCOUNTER — APPOINTMENT (OUTPATIENT)
Dept: RADIOLOGY | Facility: MEDICAL CENTER | Age: 73
End: 2018-04-13
Attending: FAMILY MEDICINE
Payer: MEDICARE

## 2018-04-13 DIAGNOSIS — C50.911 MALIGNANT NEOPLASM OF RIGHT FEMALE BREAST, UNSPECIFIED ESTROGEN RECEPTOR STATUS, UNSPECIFIED SITE OF BREAST (HCC): ICD-10-CM

## 2018-04-13 PROCEDURE — 76642 ULTRASOUND BREAST LIMITED: CPT | Mod: LT

## 2018-04-13 PROCEDURE — G0279 TOMOSYNTHESIS, MAMMO: HCPCS | Mod: LT

## 2019-05-09 ENCOUNTER — HOSPITAL ENCOUNTER (OUTPATIENT)
Dept: RADIOLOGY | Facility: MEDICAL CENTER | Age: 74
End: 2019-05-09
Attending: FAMILY MEDICINE
Payer: MEDICARE

## 2019-05-09 DIAGNOSIS — Z12.31 VISIT FOR SCREENING MAMMOGRAM: ICD-10-CM

## 2019-05-09 PROCEDURE — 77063 BREAST TOMOSYNTHESIS BI: CPT

## 2020-07-23 ENCOUNTER — HOSPITAL ENCOUNTER (OUTPATIENT)
Dept: RADIOLOGY | Facility: MEDICAL CENTER | Age: 75
End: 2020-07-23
Attending: FAMILY MEDICINE
Payer: MEDICARE

## 2020-07-23 DIAGNOSIS — Z12.31 VISIT FOR SCREENING MAMMOGRAM: ICD-10-CM

## 2020-07-23 PROCEDURE — 77067 SCR MAMMO BI INCL CAD: CPT | Mod: LT

## 2021-01-13 DIAGNOSIS — Z23 NEED FOR VACCINATION: ICD-10-CM

## 2021-02-12 ENCOUNTER — OFFICE VISIT (OUTPATIENT)
Dept: URGENT CARE | Facility: CLINIC | Age: 76
End: 2021-02-12
Payer: MEDICARE

## 2021-02-12 ENCOUNTER — HOSPITAL ENCOUNTER (OUTPATIENT)
Facility: MEDICAL CENTER | Age: 76
End: 2021-02-12
Attending: PHYSICIAN ASSISTANT
Payer: MEDICARE

## 2021-02-12 VITALS
OXYGEN SATURATION: 97 % | RESPIRATION RATE: 16 BRPM | WEIGHT: 156 LBS | DIASTOLIC BLOOD PRESSURE: 70 MMHG | SYSTOLIC BLOOD PRESSURE: 130 MMHG | BODY MASS INDEX: 29.45 KG/M2 | HEIGHT: 61 IN | HEART RATE: 97 BPM | TEMPERATURE: 99.1 F

## 2021-02-12 DIAGNOSIS — N30.00 ACUTE CYSTITIS WITHOUT HEMATURIA: ICD-10-CM

## 2021-02-12 LAB
APPEARANCE UR: NORMAL
BILIRUB UR STRIP-MCNC: NEGATIVE MG/DL
COLOR UR AUTO: YELLOW
GLUCOSE UR STRIP.AUTO-MCNC: NEGATIVE MG/DL
KETONES UR STRIP.AUTO-MCNC: NEGATIVE MG/DL
LEUKOCYTE ESTERASE UR QL STRIP.AUTO: NORMAL
NITRITE UR QL STRIP.AUTO: NEGATIVE
PH UR STRIP.AUTO: 6 [PH] (ref 5–8)
PROT UR QL STRIP: 100 MG/DL
RBC UR QL AUTO: NORMAL
SP GR UR STRIP.AUTO: 1.01
UROBILINOGEN UR STRIP-MCNC: 0.2 MG/DL

## 2021-02-12 PROCEDURE — 81002 URINALYSIS NONAUTO W/O SCOPE: CPT | Performed by: PHYSICIAN ASSISTANT

## 2021-02-12 PROCEDURE — 87077 CULTURE AEROBIC IDENTIFY: CPT

## 2021-02-12 PROCEDURE — 87186 SC STD MICRODIL/AGAR DIL: CPT

## 2021-02-12 PROCEDURE — 87086 URINE CULTURE/COLONY COUNT: CPT

## 2021-02-12 PROCEDURE — 99203 OFFICE O/P NEW LOW 30 MIN: CPT | Performed by: PHYSICIAN ASSISTANT

## 2021-02-12 RX ORDER — CEFDINIR 300 MG/1
300 CAPSULE ORAL EVERY 12 HOURS
Qty: 10 CAPSULE | Refills: 0 | Status: SHIPPED | OUTPATIENT
Start: 2021-02-12 | End: 2021-02-17

## 2021-02-12 RX ORDER — GABAPENTIN 100 MG/1
CAPSULE ORAL
COMMUNITY
Start: 2021-01-23 | End: 2021-04-07

## 2021-02-12 ASSESSMENT — ENCOUNTER SYMPTOMS
VOMITING: 0
ABDOMINAL PAIN: 0
CHILLS: 0
FLANK PAIN: 0
FEVER: 0
NAUSEA: 0

## 2021-02-12 NOTE — PROGRESS NOTES
Subjective:     Yas Ortiz  is a 75 y.o. female who presents for UTI (x 9 days )      UTI  Pertinent negatives include no abdominal pain, chills, fever, nausea, rash or vomiting.   Patient comes clinic out of concern for urinary tract infection.  She notes last approximate 9 days with waxing and waning symptoms of frequency of urination, urgency of urination and incomplete voiding.  She denies fevers chills or nausea vomiting.  She denies abdominal pain or new onset back pain (patient with chronic back pain; denies flank pain).  She notes malodorous urine but denies hematuria.  Denies abnormal vaginal discharge.  Notes past medical history of some recurrence of urinary tract infections over the last few years.  Comes with paperwork from a local hospital demonstrating treatment and work-up for urinary tract infection around 6 weeks ago.  Paperwork demonstrates at that time patient had vaginal atrophy on exam and was recommended to begin estrogen topical therapy.  She has plans to follow-up with her gynecologist about this further.  She is in the local area due to 's cancer treatments.  Patient has pushed hydration.  Denies other treatments tried.  Denies history of pyelonephritis.  Notes history of renal lithiasis.    Review of Systems   Constitutional: Negative for chills and fever.   Gastrointestinal: Negative for abdominal pain, nausea and vomiting.   Genitourinary: Positive for dysuria, frequency and urgency. Negative for flank pain and hematuria.   Skin: Negative for rash.       Medications:    • amLODIPine Tabs  • atorvastatin Tabs  • cephALEXin Caps  • cyclobenzaprine Tabs  • gabapentin Caps  • oxyCODONE immediate-release Tabs    Allergies: [place] butter, Lactose, Latex, Tylenol-codeine, and Celery oil    Problem List: Yas Ortiz has Hypercholesterolemia; Essential hypertension, benign; Shoulder pain; Chest discomfort; Breast CA (HCC); and Degeneration of lumbar intervertebral disc on  "their problem list.    Surgical History:  Past Surgical History:   Procedure Laterality Date   • LUMBAR LAMINECTOMY DISKECTOMY  5/9/2017    Procedure: LUMBAR LAMINECTOMY DISKECTOMY FOR MINIMALLY INVASIVE INDU L3-5 LAMINOTOMIES VIA RT SIDED UNI-PORTAL;  Surgeon: Gerardo Johsnon M.D.;  Location: [facility];  Service:    • MASTECTOMY  9/24/2013    Performed by Francisco Yang M.D. at SURGERY [facility]   • NODE BIOPSY SENTINEL  9/24/2013    Performed by Francisco Yang M.D. at SURGERY [facility]   • BREAST RECONSTRUCTION  9/24/2013    Performed by Dong Del Valle M.D. at [facility]   • TISSUE EXPANDER PLACE/REMOVE  9/24/2013    Performed by Dong Del Valle M.D. at [facility]   • ABDOMINAL SUBTOTAL HYSTERECTOMY     • BLADDER SUSPENSION     • CARPAL TUNNEL RELEASE      bilat   • OTHER ORTHOPEDIC SURGERY      L shoulder rotator cuff   • [place] BREAST AUGMENTATION WITH IMPLANT     • [place] REDUCTION OF BREAST         Past Social Hx: Yas Ortiz  reports that she quit smoking about 51 years ago. Her smoking use included cigarettes. She has a 2.50 pack-year smoking history. She has never used smokeless tobacco. She reports that she does not drink alcohol and does not use drugs.     Past Family Hx:  Yas Ortiz family history includes Heart Disease in her sister; Heart Failure in her father and mother; Hyperlipidemia in her mother.     Problem list, medications, and allergies reviewed by myself today in Epic.     Objective:   /70 (BP Location: Left arm, Patient Position: Sitting, BP Cuff Size: Adult long)   Pulse 97   Temp 37.3 °C (99.1 °F) (Temporal)   Resp 16   Ht 1.549 m (5' 1\")   Wt 70.8 kg (156 lb)   SpO2 97%   BMI 29.48 kg/m²     Physical Exam  Vitals and nursing note reviewed.   Constitutional:       General: She is not in acute distress.     Appearance: Normal appearance. She is well-developed. She is not diaphoretic.   HENT:      Head: " Normocephalic and atraumatic.      Right Ear: External ear normal.      Left Ear: External ear normal.      Nose: Nose normal.   Eyes:      General: Lids are normal. No scleral icterus.        Right eye: No discharge.         Left eye: No discharge.      Conjunctiva/sclera: Conjunctivae normal.   Pulmonary:      Effort: Pulmonary effort is normal. No respiratory distress.   Abdominal:      Palpations: Abdomen is soft. Abdomen is not rigid.      Tenderness: There is no abdominal tenderness. There is no right CVA tenderness, left CVA tenderness or guarding.   Musculoskeletal:         General: Normal range of motion.      Cervical back: Neck supple.   Skin:     General: Skin is warm and dry.      Coloration: Skin is not pale.      Findings: No erythema.   Neurological:      Mental Status: She is alert and oriented to person, place, and time. She is not disoriented.   Psychiatric:         Speech: Speech normal.         Behavior: Behavior normal.       POCT UA -   Results for orders placed or performed in visit on 02/12/21   POCT Urinalysis   Result Value Ref Range    POC Color Yellow Negative    POC Appearance Cloudy Negative    POC Leukocyte Esterase Small Negative    POC Nitrites Negative Negative    POC Urobiligen 0.2 Negative (0.2) mg/dL    POC Protein 100 Negative mg/dL    POC Urine PH 6.0 5.0 - 8.0    POC Blood Moderate Negative    POC Specific Gravity 1.015 <1.005 - >1.030    POC Ketones Negative Negative mg/dL    POC Bilirubin Negative Negative mg/dL    POC Glucose Negative Negative mg/dL       Urine cx pending    Assessment/Plan:   Assessment      1. Acute cystitis without hematuria  - cefdinir (OMNICEF) 300 MG Cap; Take 1 capsule by mouth every 12 hours for 5 days.  Dispense: 10 capsule; Refill: 0  - POCT Urinalysis  - URINE CULTURE(NEW); Future    Other orders  - gabapentin (NEURONTIN) 100 MG Cap  Supportive care is reviewed with patient/caregiver - recommend to push PO fluids and electrolytes,  nsaids/tylenol, rest, full course of abx, probiotics w/ abx, azo/cran, observe for resolution  Return to clinic with lack of resolution or progression of symptoms.  Will call if change of abx is indicated by urine cx  Follow-up with primary care/gynecology    I have worn an N95 mask, gloves and eye protection for the entire encounter with this patient.     Differential diagnosis, natural history, supportive care, and indications for immediate follow-up discussed.

## 2021-02-15 LAB
BACTERIA UR CULT: ABNORMAL
BACTERIA UR CULT: ABNORMAL
SIGNIFICANT IND 70042: ABNORMAL
SITE SITE: ABNORMAL
SOURCE SOURCE: ABNORMAL

## 2021-04-01 ENCOUNTER — HOSPITAL ENCOUNTER (OUTPATIENT)
Facility: MEDICAL CENTER | Age: 76
End: 2021-04-01
Attending: NEUROLOGICAL SURGERY | Admitting: NEUROLOGICAL SURGERY
Payer: MEDICARE

## 2021-04-07 ENCOUNTER — PRE-ADMISSION TESTING (OUTPATIENT)
Dept: ADMISSIONS | Facility: MEDICAL CENTER | Age: 76
End: 2021-04-07
Attending: NEUROLOGICAL SURGERY
Payer: MEDICARE

## 2021-04-07 RX ORDER — FERROUS SULFATE 325(65) MG
5000 TABLET ORAL DAILY
COMMUNITY
End: 2022-04-06

## 2021-04-26 ENCOUNTER — PRE-ADMISSION TESTING (OUTPATIENT)
Dept: ADMISSIONS | Facility: MEDICAL CENTER | Age: 76
End: 2021-04-26
Attending: NEUROLOGICAL SURGERY
Payer: MEDICARE

## 2021-04-26 ENCOUNTER — HOSPITAL ENCOUNTER (OUTPATIENT)
Dept: LAB | Facility: MEDICAL CENTER | Age: 76
End: 2021-04-26
Attending: NEUROLOGICAL SURGERY | Admitting: NEUROLOGICAL SURGERY
Payer: MEDICARE

## 2021-04-26 ENCOUNTER — HOSPITAL ENCOUNTER (OUTPATIENT)
Dept: RADIOLOGY | Facility: MEDICAL CENTER | Age: 76
End: 2021-04-26
Attending: NEUROLOGICAL SURGERY | Admitting: NEUROLOGICAL SURGERY
Payer: MEDICARE

## 2021-04-26 ENCOUNTER — HOSPITAL ENCOUNTER (OUTPATIENT)
Dept: RADIOLOGY | Facility: MEDICAL CENTER | Age: 76
End: 2021-04-26
Attending: NEUROLOGICAL SURGERY
Payer: MEDICARE

## 2021-04-26 ENCOUNTER — HOSPITAL ENCOUNTER (OUTPATIENT)
Dept: CARDIOLOGY | Facility: MEDICAL CENTER | Age: 76
End: 2021-04-26
Attending: NEUROLOGICAL SURGERY | Admitting: NEUROLOGICAL SURGERY
Payer: MEDICARE

## 2021-04-26 DIAGNOSIS — M48.061 SPINAL STENOSIS OF LUMBAR REGION, UNSPECIFIED WHETHER NEUROGENIC CLAUDICATION PRESENT: ICD-10-CM

## 2021-04-26 DIAGNOSIS — Z01.810 PRE-OPERATIVE CARDIOVASCULAR EXAMINATION: ICD-10-CM

## 2021-04-26 DIAGNOSIS — M51.26 DISC DISPLACEMENT, LUMBAR: ICD-10-CM

## 2021-04-26 DIAGNOSIS — Z01.811 PREOP RESPIRATORY EXAM: ICD-10-CM

## 2021-04-26 LAB
ANION GAP SERPL CALC-SCNC: 7 MMOL/L (ref 7–16)
APPEARANCE UR: ABNORMAL
APTT PPP: 30.1 SEC (ref 24.7–36)
BACTERIA #/AREA URNS HPF: ABNORMAL /HPF
BASOPHILS # BLD AUTO: 0.9 % (ref 0–1.8)
BASOPHILS # BLD: 0.09 K/UL (ref 0–0.12)
BILIRUB UR QL STRIP.AUTO: NEGATIVE
BUN SERPL-MCNC: 11 MG/DL (ref 8–22)
CALCIUM SERPL-MCNC: 9.9 MG/DL (ref 8.5–10.5)
CHLORIDE SERPL-SCNC: 100 MMOL/L (ref 96–112)
CO2 SERPL-SCNC: 28 MMOL/L (ref 20–33)
COLOR UR: YELLOW
CREAT SERPL-MCNC: 0.7 MG/DL (ref 0.5–1.4)
EKG IMPRESSION: NORMAL
EOSINOPHIL # BLD AUTO: 0.16 K/UL (ref 0–0.51)
EOSINOPHIL NFR BLD: 1.6 % (ref 0–6.9)
EPI CELLS #/AREA URNS HPF: NEGATIVE /HPF
ERYTHROCYTE [DISTWIDTH] IN BLOOD BY AUTOMATED COUNT: 43.8 FL (ref 35.9–50)
GLUCOSE SERPL-MCNC: 94 MG/DL (ref 65–99)
GLUCOSE UR STRIP.AUTO-MCNC: NEGATIVE MG/DL
HCT VFR BLD AUTO: 48.3 % (ref 37–47)
HGB BLD-MCNC: 16.1 G/DL (ref 12–16)
IMM GRANULOCYTES # BLD AUTO: 0.03 K/UL (ref 0–0.11)
IMM GRANULOCYTES NFR BLD AUTO: 0.3 % (ref 0–0.9)
INR PPP: 0.9 (ref 0.87–1.13)
KETONES UR STRIP.AUTO-MCNC: NEGATIVE MG/DL
LEUKOCYTE ESTERASE UR QL STRIP.AUTO: ABNORMAL
LYMPHOCYTES # BLD AUTO: 2.51 K/UL (ref 1–4.8)
LYMPHOCYTES NFR BLD: 25.1 % (ref 22–41)
MCH RBC QN AUTO: 29.9 PG (ref 27–33)
MCHC RBC AUTO-ENTMCNC: 33.3 G/DL (ref 33.6–35)
MCV RBC AUTO: 89.8 FL (ref 81.4–97.8)
MICRO URNS: ABNORMAL
MONOCYTES # BLD AUTO: 0.8 K/UL (ref 0–0.85)
MONOCYTES NFR BLD AUTO: 8 % (ref 0–13.4)
NEUTROPHILS # BLD AUTO: 6.42 K/UL (ref 2–7.15)
NEUTROPHILS NFR BLD: 64.1 % (ref 44–72)
NITRITE UR QL STRIP.AUTO: POSITIVE
NRBC # BLD AUTO: 0 K/UL
NRBC BLD-RTO: 0 /100 WBC
PH UR STRIP.AUTO: 6.5 [PH] (ref 5–8)
PLATELET # BLD AUTO: 288 K/UL (ref 164–446)
PMV BLD AUTO: 8.6 FL (ref 9–12.9)
POTASSIUM SERPL-SCNC: 3.9 MMOL/L (ref 3.6–5.5)
PROT UR QL STRIP: NEGATIVE MG/DL
PROTHROMBIN TIME: 12.4 SEC (ref 12–14.6)
RBC # BLD AUTO: 5.38 M/UL (ref 4.2–5.4)
RBC # URNS HPF: ABNORMAL /HPF
RBC UR QL AUTO: ABNORMAL
SODIUM SERPL-SCNC: 135 MMOL/L (ref 135–145)
SP GR UR STRIP.AUTO: 1.01
UROBILINOGEN UR STRIP.AUTO-MCNC: 0.2 MG/DL
WBC # BLD AUTO: 10 K/UL (ref 4.8–10.8)
WBC #/AREA URNS HPF: ABNORMAL /HPF

## 2021-04-26 PROCEDURE — 80048 BASIC METABOLIC PNL TOTAL CA: CPT

## 2021-04-26 PROCEDURE — 72110 X-RAY EXAM L-2 SPINE 4/>VWS: CPT

## 2021-04-26 PROCEDURE — 93005 ELECTROCARDIOGRAM TRACING: CPT | Performed by: NEUROLOGICAL SURGERY

## 2021-04-26 PROCEDURE — 87186 SC STD MICRODIL/AGAR DIL: CPT

## 2021-04-26 PROCEDURE — 71046 X-RAY EXAM CHEST 2 VIEWS: CPT

## 2021-04-26 PROCEDURE — 81001 URINALYSIS AUTO W/SCOPE: CPT

## 2021-04-26 PROCEDURE — 93010 ELECTROCARDIOGRAM REPORT: CPT | Performed by: INTERNAL MEDICINE

## 2021-04-26 PROCEDURE — 85730 THROMBOPLASTIN TIME PARTIAL: CPT

## 2021-04-26 PROCEDURE — 36415 COLL VENOUS BLD VENIPUNCTURE: CPT

## 2021-04-26 PROCEDURE — 85610 PROTHROMBIN TIME: CPT

## 2021-04-26 PROCEDURE — 87077 CULTURE AEROBIC IDENTIFY: CPT

## 2021-04-26 PROCEDURE — 85025 COMPLETE CBC W/AUTO DIFF WBC: CPT

## 2021-04-26 PROCEDURE — 87086 URINE CULTURE/COLONY COUNT: CPT

## 2021-05-03 NOTE — OR NURSING
COVID-19 Pre-surgery screenin. Do you have an undiagnosed respiratory illness or symptoms such as coughing or sneezing? No  a. Onset of Sx No  b. Acute vs. chronic respiratory illness No    2. Do you have an unexplained fever greater than 100.4 degrees Fahrenheit or 38 degrees Celsius?     No    3. Have you had direct exposure to a patient who tested positive for Covid-19?    No    4.Have you had any loss of your sense of taste or smell? Have you had N/V or sore throat? No    Patient has been informed of visitor policy and asked to wear a mask upon entering the hospital   Yes

## 2021-05-04 ENCOUNTER — ANESTHESIA (OUTPATIENT)
Dept: SURGERY | Facility: MEDICAL CENTER | Age: 76
End: 2021-05-04
Payer: MEDICARE

## 2021-05-04 ENCOUNTER — APPOINTMENT (OUTPATIENT)
Dept: RADIOLOGY | Facility: MEDICAL CENTER | Age: 76
End: 2021-05-04
Attending: NEUROLOGICAL SURGERY
Payer: MEDICARE

## 2021-05-04 ENCOUNTER — ANESTHESIA EVENT (OUTPATIENT)
Dept: SURGERY | Facility: MEDICAL CENTER | Age: 76
End: 2021-05-04
Payer: MEDICARE

## 2021-05-04 ENCOUNTER — HOSPITAL ENCOUNTER (OUTPATIENT)
Facility: MEDICAL CENTER | Age: 76
End: 2021-05-05
Attending: NEUROLOGICAL SURGERY | Admitting: NEUROLOGICAL SURGERY
Payer: MEDICARE

## 2021-05-04 DIAGNOSIS — M54.16 SPINAL STENOSIS OF LUMBAR REGION WITH RADICULOPATHY: ICD-10-CM

## 2021-05-04 DIAGNOSIS — M51.36 DEGENERATION OF LUMBAR INTERVERTEBRAL DISC: ICD-10-CM

## 2021-05-04 DIAGNOSIS — M48.061 SPINAL STENOSIS OF LUMBAR REGION WITH RADICULOPATHY: ICD-10-CM

## 2021-05-04 PROCEDURE — 700105 HCHG RX REV CODE 258: Performed by: NEUROLOGICAL SURGERY

## 2021-05-04 PROCEDURE — 700101 HCHG RX REV CODE 250: Performed by: PHYSICIAN ASSISTANT

## 2021-05-04 PROCEDURE — 96365 THER/PROPH/DIAG IV INF INIT: CPT | Mod: XU

## 2021-05-04 PROCEDURE — 700101 HCHG RX REV CODE 250: Performed by: ANESTHESIOLOGY

## 2021-05-04 PROCEDURE — 160002 HCHG RECOVERY MINUTES (STAT): Performed by: NEUROLOGICAL SURGERY

## 2021-05-04 PROCEDURE — 160041 HCHG SURGERY MINUTES - EA ADDL 1 MIN LEVEL 4: Performed by: NEUROLOGICAL SURGERY

## 2021-05-04 PROCEDURE — G0378 HOSPITAL OBSERVATION PER HR: HCPCS

## 2021-05-04 PROCEDURE — 700102 HCHG RX REV CODE 250 W/ 637 OVERRIDE(OP): Performed by: PHYSICIAN ASSISTANT

## 2021-05-04 PROCEDURE — A9270 NON-COVERED ITEM OR SERVICE: HCPCS | Performed by: PHYSICIAN ASSISTANT

## 2021-05-04 PROCEDURE — 700111 HCHG RX REV CODE 636 W/ 250 OVERRIDE (IP): Performed by: ANESTHESIOLOGY

## 2021-05-04 PROCEDURE — 501838 HCHG SUTURE GENERAL: Performed by: NEUROLOGICAL SURGERY

## 2021-05-04 PROCEDURE — 160035 HCHG PACU - 1ST 60 MINS PHASE I: Performed by: NEUROLOGICAL SURGERY

## 2021-05-04 PROCEDURE — 700105 HCHG RX REV CODE 258: Performed by: ANESTHESIOLOGY

## 2021-05-04 PROCEDURE — 500885 HCHG PACK, JACKSON TABLE: Performed by: NEUROLOGICAL SURGERY

## 2021-05-04 PROCEDURE — 700111 HCHG RX REV CODE 636 W/ 250 OVERRIDE (IP): Performed by: NEUROLOGICAL SURGERY

## 2021-05-04 PROCEDURE — 700101 HCHG RX REV CODE 250: Performed by: NEUROLOGICAL SURGERY

## 2021-05-04 PROCEDURE — 500367 HCHG DRAIN KIT, HEMOVAC: Performed by: NEUROLOGICAL SURGERY

## 2021-05-04 PROCEDURE — 160036 HCHG PACU - EA ADDL 30 MINS PHASE I: Performed by: NEUROLOGICAL SURGERY

## 2021-05-04 PROCEDURE — 160029 HCHG SURGERY MINUTES - 1ST 30 MINS LEVEL 4: Performed by: NEUROLOGICAL SURGERY

## 2021-05-04 PROCEDURE — 160048 HCHG OR STATISTICAL LEVEL 1-5: Performed by: NEUROLOGICAL SURGERY

## 2021-05-04 PROCEDURE — A9270 NON-COVERED ITEM OR SERVICE: HCPCS | Performed by: ANESTHESIOLOGY

## 2021-05-04 PROCEDURE — 110454 HCHG SHELL REV 250: Performed by: NEUROLOGICAL SURGERY

## 2021-05-04 PROCEDURE — 700102 HCHG RX REV CODE 250 W/ 637 OVERRIDE(OP): Performed by: ANESTHESIOLOGY

## 2021-05-04 PROCEDURE — 72020 X-RAY EXAM OF SPINE 1 VIEW: CPT

## 2021-05-04 PROCEDURE — 160009 HCHG ANES TIME/MIN: Performed by: NEUROLOGICAL SURGERY

## 2021-05-04 PROCEDURE — A9270 NON-COVERED ITEM OR SERVICE: HCPCS | Performed by: NEUROLOGICAL SURGERY

## 2021-05-04 PROCEDURE — 700111 HCHG RX REV CODE 636 W/ 250 OVERRIDE (IP): Performed by: PHYSICIAN ASSISTANT

## 2021-05-04 RX ORDER — GABAPENTIN 300 MG/1
300 CAPSULE ORAL ONCE
Status: COMPLETED | OUTPATIENT
Start: 2021-05-04 | End: 2021-05-04

## 2021-05-04 RX ORDER — HYDROMORPHONE HYDROCHLORIDE 1 MG/ML
0.5 INJECTION, SOLUTION INTRAMUSCULAR; INTRAVENOUS; SUBCUTANEOUS
Status: DISCONTINUED | OUTPATIENT
Start: 2021-05-04 | End: 2021-05-05 | Stop reason: HOSPADM

## 2021-05-04 RX ORDER — SULFAMETHOXAZOLE AND TRIMETHOPRIM 800; 160 MG/1; MG/1
1 TABLET ORAL 2 TIMES DAILY
Status: ON HOLD | COMMUNITY
Start: 2021-04-29 | End: 2021-05-05 | Stop reason: SDUPTHER

## 2021-05-04 RX ORDER — DEXAMETHASONE SODIUM PHOSPHATE 4 MG/ML
INJECTION, SOLUTION INTRA-ARTICULAR; INTRALESIONAL; INTRAMUSCULAR; INTRAVENOUS; SOFT TISSUE PRN
Status: DISCONTINUED | OUTPATIENT
Start: 2021-05-04 | End: 2021-05-04 | Stop reason: SURG

## 2021-05-04 RX ORDER — DIPHENHYDRAMINE HCL 25 MG
25 TABLET ORAL EVERY 6 HOURS PRN
Status: DISCONTINUED | OUTPATIENT
Start: 2021-05-04 | End: 2021-05-05 | Stop reason: HOSPADM

## 2021-05-04 RX ORDER — ENEMA 19; 7 G/133ML; G/133ML
1 ENEMA RECTAL
Status: DISCONTINUED | OUTPATIENT
Start: 2021-05-04 | End: 2021-05-05 | Stop reason: HOSPADM

## 2021-05-04 RX ORDER — OXYCODONE HCL 5 MG/5 ML
5 SOLUTION, ORAL ORAL
Status: COMPLETED | OUTPATIENT
Start: 2021-05-04 | End: 2021-05-04

## 2021-05-04 RX ORDER — ONDANSETRON 4 MG/1
4 TABLET, ORALLY DISINTEGRATING ORAL EVERY 4 HOURS PRN
Status: DISCONTINUED | OUTPATIENT
Start: 2021-05-04 | End: 2021-05-05 | Stop reason: HOSPADM

## 2021-05-04 RX ORDER — OXYCODONE HCL 5 MG/5 ML
10 SOLUTION, ORAL ORAL
Status: COMPLETED | OUTPATIENT
Start: 2021-05-04 | End: 2021-05-04

## 2021-05-04 RX ORDER — AMOXICILLIN 250 MG
1 CAPSULE ORAL NIGHTLY
Status: DISCONTINUED | OUTPATIENT
Start: 2021-05-04 | End: 2021-05-05 | Stop reason: HOSPADM

## 2021-05-04 RX ORDER — LIDOCAINE HYDROCHLORIDE 20 MG/ML
INJECTION, SOLUTION EPIDURAL; INFILTRATION; INTRACAUDAL; PERINEURAL PRN
Status: DISCONTINUED | OUTPATIENT
Start: 2021-05-04 | End: 2021-05-04 | Stop reason: SURG

## 2021-05-04 RX ORDER — ACETAMINOPHEN 500 MG
1000 TABLET ORAL ONCE
Status: COMPLETED | OUTPATIENT
Start: 2021-05-04 | End: 2021-05-04

## 2021-05-04 RX ORDER — ONDANSETRON 2 MG/ML
4 INJECTION INTRAMUSCULAR; INTRAVENOUS
Status: DISCONTINUED | OUTPATIENT
Start: 2021-05-04 | End: 2021-05-04 | Stop reason: HOSPADM

## 2021-05-04 RX ORDER — CEFAZOLIN SODIUM 2 G/100ML
2 INJECTION, SOLUTION INTRAVENOUS EVERY 8 HOURS
Status: COMPLETED | OUTPATIENT
Start: 2021-05-04 | End: 2021-05-05

## 2021-05-04 RX ORDER — SODIUM CHLORIDE, SODIUM LACTATE, POTASSIUM CHLORIDE, CALCIUM CHLORIDE 600; 310; 30; 20 MG/100ML; MG/100ML; MG/100ML; MG/100ML
INJECTION, SOLUTION INTRAVENOUS CONTINUOUS
Status: ACTIVE | OUTPATIENT
Start: 2021-05-04 | End: 2021-05-04

## 2021-05-04 RX ORDER — CALCIUM CARBONATE 500 MG/1
500 TABLET, CHEWABLE ORAL 2 TIMES DAILY
Status: DISCONTINUED | OUTPATIENT
Start: 2021-05-04 | End: 2021-05-05 | Stop reason: HOSPADM

## 2021-05-04 RX ORDER — HYDRALAZINE HYDROCHLORIDE 20 MG/ML
5 INJECTION INTRAMUSCULAR; INTRAVENOUS
Status: DISCONTINUED | OUTPATIENT
Start: 2021-05-04 | End: 2021-05-04 | Stop reason: HOSPADM

## 2021-05-04 RX ORDER — ATORVASTATIN CALCIUM 10 MG/1
10 TABLET, FILM COATED ORAL DAILY
Status: DISCONTINUED | OUTPATIENT
Start: 2021-05-04 | End: 2021-05-05 | Stop reason: HOSPADM

## 2021-05-04 RX ORDER — CEFAZOLIN SODIUM 1 G/3ML
INJECTION, POWDER, FOR SOLUTION INTRAMUSCULAR; INTRAVENOUS
Status: DISCONTINUED | OUTPATIENT
Start: 2021-05-04 | End: 2021-05-04 | Stop reason: HOSPADM

## 2021-05-04 RX ORDER — SODIUM CHLORIDE, SODIUM LACTATE, POTASSIUM CHLORIDE, CALCIUM CHLORIDE 600; 310; 30; 20 MG/100ML; MG/100ML; MG/100ML; MG/100ML
INJECTION, SOLUTION INTRAVENOUS
Status: DISCONTINUED | OUTPATIENT
Start: 2021-05-04 | End: 2021-05-04 | Stop reason: SURG

## 2021-05-04 RX ORDER — CEFAZOLIN SODIUM 1 G/3ML
INJECTION, POWDER, FOR SOLUTION INTRAMUSCULAR; INTRAVENOUS PRN
Status: DISCONTINUED | OUTPATIENT
Start: 2021-05-04 | End: 2021-05-04 | Stop reason: SURG

## 2021-05-04 RX ORDER — DEXTROSE MONOHYDRATE, SODIUM CHLORIDE, AND POTASSIUM CHLORIDE 50; 1.49; 4.5 G/1000ML; G/1000ML; G/1000ML
INJECTION, SOLUTION INTRAVENOUS CONTINUOUS
Status: DISCONTINUED | OUTPATIENT
Start: 2021-05-04 | End: 2021-05-05 | Stop reason: HOSPADM

## 2021-05-04 RX ORDER — HYDROMORPHONE HYDROCHLORIDE 1 MG/ML
0.1 INJECTION, SOLUTION INTRAMUSCULAR; INTRAVENOUS; SUBCUTANEOUS
Status: DISCONTINUED | OUTPATIENT
Start: 2021-05-04 | End: 2021-05-04 | Stop reason: HOSPADM

## 2021-05-04 RX ORDER — DIPHENHYDRAMINE HYDROCHLORIDE 50 MG/ML
25 INJECTION INTRAMUSCULAR; INTRAVENOUS EVERY 6 HOURS PRN
Status: DISCONTINUED | OUTPATIENT
Start: 2021-05-04 | End: 2021-05-05 | Stop reason: HOSPADM

## 2021-05-04 RX ORDER — HALOPERIDOL 5 MG/ML
1 INJECTION INTRAMUSCULAR
Status: DISCONTINUED | OUTPATIENT
Start: 2021-05-04 | End: 2021-05-04 | Stop reason: HOSPADM

## 2021-05-04 RX ORDER — VANCOMYCIN HYDROCHLORIDE 1 G/20ML
INJECTION, POWDER, LYOPHILIZED, FOR SOLUTION INTRAVENOUS
Status: COMPLETED | OUTPATIENT
Start: 2021-05-04 | End: 2021-05-04

## 2021-05-04 RX ORDER — BISACODYL 10 MG
10 SUPPOSITORY, RECTAL RECTAL
Status: DISCONTINUED | OUTPATIENT
Start: 2021-05-04 | End: 2021-05-05 | Stop reason: HOSPADM

## 2021-05-04 RX ORDER — HYDROMORPHONE HYDROCHLORIDE 1 MG/ML
0.4 INJECTION, SOLUTION INTRAMUSCULAR; INTRAVENOUS; SUBCUTANEOUS
Status: DISCONTINUED | OUTPATIENT
Start: 2021-05-04 | End: 2021-05-04 | Stop reason: HOSPADM

## 2021-05-04 RX ORDER — MEPERIDINE HYDROCHLORIDE 25 MG/ML
12.5 INJECTION INTRAMUSCULAR; INTRAVENOUS; SUBCUTANEOUS
Status: DISCONTINUED | OUTPATIENT
Start: 2021-05-04 | End: 2021-05-04 | Stop reason: HOSPADM

## 2021-05-04 RX ORDER — ONDANSETRON 2 MG/ML
INJECTION INTRAMUSCULAR; INTRAVENOUS PRN
Status: DISCONTINUED | OUTPATIENT
Start: 2021-05-04 | End: 2021-05-04 | Stop reason: SURG

## 2021-05-04 RX ORDER — LABETALOL HYDROCHLORIDE 5 MG/ML
10 INJECTION, SOLUTION INTRAVENOUS
Status: DISCONTINUED | OUTPATIENT
Start: 2021-05-04 | End: 2021-05-05 | Stop reason: HOSPADM

## 2021-05-04 RX ORDER — DOCUSATE SODIUM 100 MG/1
100 CAPSULE, LIQUID FILLED ORAL 2 TIMES DAILY
Status: DISCONTINUED | OUTPATIENT
Start: 2021-05-04 | End: 2021-05-05 | Stop reason: HOSPADM

## 2021-05-04 RX ORDER — AMOXICILLIN 250 MG
1 CAPSULE ORAL
Status: DISCONTINUED | OUTPATIENT
Start: 2021-05-04 | End: 2021-05-05 | Stop reason: HOSPADM

## 2021-05-04 RX ORDER — AMLODIPINE BESYLATE 2.5 MG/1
2.5 TABLET ORAL DAILY
Status: DISCONTINUED | OUTPATIENT
Start: 2021-05-05 | End: 2021-05-05 | Stop reason: HOSPADM

## 2021-05-04 RX ORDER — HYDROCODONE BITARTRATE AND ACETAMINOPHEN 10; 325 MG/1; MG/1
1 TABLET ORAL EVERY 4 HOURS PRN
Status: DISCONTINUED | OUTPATIENT
Start: 2021-05-04 | End: 2021-05-05 | Stop reason: HOSPADM

## 2021-05-04 RX ORDER — OXYCODONE HYDROCHLORIDE 5 MG/1
5 TABLET ORAL EVERY 4 HOURS PRN
Status: DISCONTINUED | OUTPATIENT
Start: 2021-05-04 | End: 2021-05-05 | Stop reason: HOSPADM

## 2021-05-04 RX ORDER — LABETALOL HYDROCHLORIDE 5 MG/ML
5 INJECTION, SOLUTION INTRAVENOUS
Status: DISCONTINUED | OUTPATIENT
Start: 2021-05-04 | End: 2021-05-04 | Stop reason: HOSPADM

## 2021-05-04 RX ORDER — BUPIVACAINE HYDROCHLORIDE AND EPINEPHRINE 5; 5 MG/ML; UG/ML
INJECTION, SOLUTION EPIDURAL; INTRACAUDAL; PERINEURAL
Status: DISCONTINUED | OUTPATIENT
Start: 2021-05-04 | End: 2021-05-04 | Stop reason: HOSPADM

## 2021-05-04 RX ORDER — ALPRAZOLAM 0.25 MG/1
0.25 TABLET ORAL 2 TIMES DAILY PRN
Status: DISCONTINUED | OUTPATIENT
Start: 2021-05-04 | End: 2021-05-05 | Stop reason: HOSPADM

## 2021-05-04 RX ORDER — PHENYLEPHRINE HYDROCHLORIDE 10 MG/ML
INJECTION, SOLUTION INTRAMUSCULAR; INTRAVENOUS; SUBCUTANEOUS PRN
Status: DISCONTINUED | OUTPATIENT
Start: 2021-05-04 | End: 2021-05-04 | Stop reason: SURG

## 2021-05-04 RX ORDER — METOCLOPRAMIDE HYDROCHLORIDE 5 MG/ML
INJECTION INTRAMUSCULAR; INTRAVENOUS PRN
Status: DISCONTINUED | OUTPATIENT
Start: 2021-05-04 | End: 2021-05-04 | Stop reason: SURG

## 2021-05-04 RX ORDER — POLYETHYLENE GLYCOL 3350 17 G/17G
1 POWDER, FOR SOLUTION ORAL 2 TIMES DAILY PRN
Status: DISCONTINUED | OUTPATIENT
Start: 2021-05-04 | End: 2021-05-05 | Stop reason: HOSPADM

## 2021-05-04 RX ORDER — HYDROMORPHONE HYDROCHLORIDE 1 MG/ML
0.2 INJECTION, SOLUTION INTRAMUSCULAR; INTRAVENOUS; SUBCUTANEOUS
Status: DISCONTINUED | OUTPATIENT
Start: 2021-05-04 | End: 2021-05-04 | Stop reason: HOSPADM

## 2021-05-04 RX ORDER — SODIUM CHLORIDE, SODIUM LACTATE, POTASSIUM CHLORIDE, CALCIUM CHLORIDE 600; 310; 30; 20 MG/100ML; MG/100ML; MG/100ML; MG/100ML
INJECTION, SOLUTION INTRAVENOUS CONTINUOUS
Status: DISCONTINUED | OUTPATIENT
Start: 2021-05-04 | End: 2021-05-04 | Stop reason: HOSPADM

## 2021-05-04 RX ORDER — ROCURONIUM BROMIDE 10 MG/ML
INJECTION, SOLUTION INTRAVENOUS PRN
Status: DISCONTINUED | OUTPATIENT
Start: 2021-05-04 | End: 2021-05-04 | Stop reason: SURG

## 2021-05-04 RX ORDER — CYCLOBENZAPRINE HCL 10 MG
10 TABLET ORAL EVERY 8 HOURS PRN
Status: DISCONTINUED | OUTPATIENT
Start: 2021-05-04 | End: 2021-05-05 | Stop reason: HOSPADM

## 2021-05-04 RX ORDER — ONDANSETRON 2 MG/ML
4 INJECTION INTRAMUSCULAR; INTRAVENOUS EVERY 4 HOURS PRN
Status: DISCONTINUED | OUTPATIENT
Start: 2021-05-04 | End: 2021-05-05 | Stop reason: HOSPADM

## 2021-05-04 RX ORDER — KETOROLAC TROMETHAMINE 30 MG/ML
INJECTION, SOLUTION INTRAMUSCULAR; INTRAVENOUS PRN
Status: DISCONTINUED | OUTPATIENT
Start: 2021-05-04 | End: 2021-05-04 | Stop reason: HOSPADM

## 2021-05-04 RX ADMIN — FENTANYL CITRATE 50 MCG: 50 INJECTION, SOLUTION INTRAMUSCULAR; INTRAVENOUS at 13:59

## 2021-05-04 RX ADMIN — ATORVASTATIN CALCIUM 10 MG: 10 TABLET, FILM COATED ORAL at 18:24

## 2021-05-04 RX ADMIN — ROCURONIUM BROMIDE 10 MG: 10 INJECTION, SOLUTION INTRAVENOUS at 12:56

## 2021-05-04 RX ADMIN — FENTANYL CITRATE 25 MCG: 50 INJECTION, SOLUTION INTRAMUSCULAR; INTRAVENOUS at 14:54

## 2021-05-04 RX ADMIN — FENTANYL CITRATE 25 MCG: 50 INJECTION, SOLUTION INTRAMUSCULAR; INTRAVENOUS at 14:49

## 2021-05-04 RX ADMIN — FENTANYL CITRATE 25 MCG: 50 INJECTION, SOLUTION INTRAMUSCULAR; INTRAVENOUS at 14:14

## 2021-05-04 RX ADMIN — CEFAZOLIN 2 G: 330 INJECTION, POWDER, FOR SOLUTION INTRAMUSCULAR; INTRAVENOUS at 12:19

## 2021-05-04 RX ADMIN — ACETAMINOPHEN 1000 MG: 500 TABLET ORAL at 11:36

## 2021-05-04 RX ADMIN — CEFAZOLIN SODIUM 2 G: 2 INJECTION, SOLUTION INTRAVENOUS at 22:41

## 2021-05-04 RX ADMIN — FENTANYL CITRATE 25 MCG: 50 INJECTION, SOLUTION INTRAMUSCULAR; INTRAVENOUS at 13:56

## 2021-05-04 RX ADMIN — LIDOCAINE HYDROCHLORIDE 100 MG: 20 INJECTION, SOLUTION EPIDURAL; INFILTRATION; INTRACAUDAL at 12:19

## 2021-05-04 RX ADMIN — POTASSIUM CHLORIDE, DEXTROSE MONOHYDRATE AND SODIUM CHLORIDE: 150; 5; 450 INJECTION, SOLUTION INTRAVENOUS at 18:28

## 2021-05-04 RX ADMIN — ROCURONIUM BROMIDE 30 MG: 10 INJECTION, SOLUTION INTRAVENOUS at 12:19

## 2021-05-04 RX ADMIN — ONDANSETRON 4 MG: 2 INJECTION INTRAMUSCULAR; INTRAVENOUS at 14:01

## 2021-05-04 RX ADMIN — SUGAMMADEX 200 MG: 100 INJECTION, SOLUTION INTRAVENOUS at 14:20

## 2021-05-04 RX ADMIN — OXYCODONE HYDROCHLORIDE 10 MG: 5 SOLUTION ORAL at 14:54

## 2021-05-04 RX ADMIN — ANTACID TABLETS 500 MG: 500 TABLET, CHEWABLE ORAL at 18:24

## 2021-05-04 RX ADMIN — ROCURONIUM BROMIDE 10 MG: 10 INJECTION, SOLUTION INTRAVENOUS at 13:39

## 2021-05-04 RX ADMIN — FENTANYL CITRATE 25 MCG: 50 INJECTION, SOLUTION INTRAMUSCULAR; INTRAVENOUS at 15:10

## 2021-05-04 RX ADMIN — SODIUM CHLORIDE, POTASSIUM CHLORIDE, SODIUM LACTATE AND CALCIUM CHLORIDE 1000 ML: 600; 310; 30; 20 INJECTION, SOLUTION INTRAVENOUS at 11:36

## 2021-05-04 RX ADMIN — PHENYLEPHRINE HYDROCHLORIDE 200 MCG: 10 INJECTION INTRAVENOUS at 12:31

## 2021-05-04 RX ADMIN — GABAPENTIN 300 MG: 300 CAPSULE ORAL at 11:36

## 2021-05-04 RX ADMIN — ROCURONIUM BROMIDE 10 MG: 10 INJECTION, SOLUTION INTRAVENOUS at 13:16

## 2021-05-04 RX ADMIN — PHENYLEPHRINE HYDROCHLORIDE 200 MCG: 10 INJECTION INTRAVENOUS at 13:31

## 2021-05-04 RX ADMIN — PROPOFOL 200 MG: 10 INJECTION, EMULSION INTRAVENOUS at 12:19

## 2021-05-04 RX ADMIN — SODIUM CHLORIDE, POTASSIUM CHLORIDE, SODIUM LACTATE AND CALCIUM CHLORIDE: 600; 310; 30; 20 INJECTION, SOLUTION INTRAVENOUS at 12:14

## 2021-05-04 RX ADMIN — Medication 140 MG: at 12:19

## 2021-05-04 RX ADMIN — POVIDONE IODINE 15 ML: 100 SOLUTION TOPICAL at 11:36

## 2021-05-04 RX ADMIN — DOCUSATE SODIUM 100 MG: 100 CAPSULE, LIQUID FILLED ORAL at 18:24

## 2021-05-04 RX ADMIN — ROCURONIUM BROMIDE 10 MG: 10 INJECTION, SOLUTION INTRAVENOUS at 12:41

## 2021-05-04 RX ADMIN — FENTANYL CITRATE 25 MCG: 50 INJECTION, SOLUTION INTRAMUSCULAR; INTRAVENOUS at 13:42

## 2021-05-04 RX ADMIN — FENTANYL CITRATE 25 MCG: 50 INJECTION, SOLUTION INTRAMUSCULAR; INTRAVENOUS at 14:09

## 2021-05-04 RX ADMIN — METOCLOPRAMIDE 10 MG: 5 INJECTION, SOLUTION INTRAMUSCULAR; INTRAVENOUS at 14:01

## 2021-05-04 RX ADMIN — FENTANYL CITRATE 50 MCG: 50 INJECTION, SOLUTION INTRAMUSCULAR; INTRAVENOUS at 13:11

## 2021-05-04 RX ADMIN — DEXAMETHASONE SODIUM PHOSPHATE 4 MG: 4 INJECTION, SOLUTION INTRA-ARTICULAR; INTRALESIONAL; INTRAMUSCULAR; INTRAVENOUS; SOFT TISSUE at 12:19

## 2021-05-04 RX ADMIN — KETOROLAC TROMETHAMINE 15 MG: 30 INJECTION, SOLUTION INTRAMUSCULAR at 14:00

## 2021-05-04 ASSESSMENT — LIFESTYLE VARIABLES
TOTAL SCORE: 0
HAVE PEOPLE ANNOYED YOU BY CRITICIZING YOUR DRINKING: NO
ALCOHOL_USE: NO
AVERAGE NUMBER OF DAYS PER WEEK YOU HAVE A DRINK CONTAINING ALCOHOL: 0
HOW MANY TIMES IN THE PAST YEAR HAVE YOU HAD 5 OR MORE DRINKS IN A DAY: 0
HAVE YOU EVER FELT YOU SHOULD CUT DOWN ON YOUR DRINKING: NO
CONSUMPTION TOTAL: NEGATIVE
TOTAL SCORE: 0
EVER FELT BAD OR GUILTY ABOUT YOUR DRINKING: NO
ALCOHOL_USE: NO
TOTAL SCORE: 0
HAVE YOU EVER FELT YOU SHOULD CUT DOWN ON YOUR DRINKING: NO
HAVE PEOPLE ANNOYED YOU BY CRITICIZING YOUR DRINKING: NO
EVER FELT BAD OR GUILTY ABOUT YOUR DRINKING: NO
DOES PATIENT WANT TO STOP DRINKING: NO
CONSUMPTION TOTAL: INCOMPLETE
TOTAL SCORE: 0
TOTAL SCORE: 0
EVER HAD A DRINK FIRST THING IN THE MORNING TO STEADY YOUR NERVES TO GET RID OF A HANGOVER: NO
ON A TYPICAL DAY WHEN YOU DRINK ALCOHOL HOW MANY DRINKS DO YOU HAVE: 0
TOTAL SCORE: 0
EVER HAD A DRINK FIRST THING IN THE MORNING TO STEADY YOUR NERVES TO GET RID OF A HANGOVER: NO

## 2021-05-04 ASSESSMENT — PAIN DESCRIPTION - PAIN TYPE
TYPE: SURGICAL PAIN

## 2021-05-04 ASSESSMENT — COGNITIVE AND FUNCTIONAL STATUS - GENERAL
STANDING UP FROM CHAIR USING ARMS: A LITTLE
MOBILITY SCORE: 19
DAILY ACTIVITIY SCORE: 24
TURNING FROM BACK TO SIDE WHILE IN FLAT BAD: A LITTLE
SUGGESTED CMS G CODE MODIFIER DAILY ACTIVITY: CH
SUGGESTED CMS G CODE MODIFIER MOBILITY: CK
MOVING TO AND FROM BED TO CHAIR: A LITTLE
CLIMB 3 TO 5 STEPS WITH RAILING: A LITTLE
MOVING FROM LYING ON BACK TO SITTING ON SIDE OF FLAT BED: A LITTLE

## 2021-05-04 ASSESSMENT — PATIENT HEALTH QUESTIONNAIRE - PHQ9
2. FEELING DOWN, DEPRESSED, IRRITABLE, OR HOPELESS: NOT AT ALL
1. LITTLE INTEREST OR PLEASURE IN DOING THINGS: NOT AT ALL
SUM OF ALL RESPONSES TO PHQ9 QUESTIONS 1 AND 2: 0

## 2021-05-04 NOTE — OR NURSING
Assumed care of patient . Alert and oriented times four. Educated patient about plan of care for surgery/procedure. See flow sheets for vital signs. Assessment completed. Patient safety measures in place, call light within reach, non skid socks in use, patient educated to call for help before getting up. Hourly rounding in place.   Triple Aim completed.

## 2021-05-04 NOTE — ANESTHESIA PREPROCEDURE EVALUATION
Relevant Problems   CARDIAC   (+) Essential hypertension, benign      Other   (+) Breast CA (HCC)   (+) Degeneration of lumbar intervertebral disc   (+) Hypercholesterolemia       Physical Exam    Airway   Mallampati: II  TM distance: <3 FB  Neck ROM: full       Cardiovascular - normal exam  Rhythm: regular  Rate: normal  (-) murmur     Dental - normal exam           Pulmonary - normal exam  Breath sounds clear to auscultation     Abdominal    Neurological - normal exam                 Anesthesia Plan    ASA 2       Plan - general       Airway plan will be ETT          Induction: intravenous      Pertinent diagnostic labs and testing reviewed    Informed Consent:    Anesthetic plan and risks discussed with patient.

## 2021-05-04 NOTE — ANESTHESIA PROCEDURE NOTES
Airway    Date/Time: 5/4/2021 12:20 PM  Performed by: Akin Kenney M.D.  Authorized by: Akin Kenney M.D.     Location:  OR  Urgency:  Elective  Difficult Airway: No    Indications for Airway Management:  Anesthesia      Spontaneous Ventilation: absent    Sedation Level:  Deep  Preoxygenated: Yes    Patient Position:  Sniffing  Final Airway Type:  Endotracheal airway  Final Endotracheal Airway:  ETT  Cuffed: Yes    Technique Used for Successful ETT Placement:  Direct laryngoscopy  Devices/Methods Used in Placement:  Intubating stylet    Insertion Site:  Oral  Blade Type:  Jamal  Laryngoscope Blade/Videolaryngoscope Blade Size:  3  ETT Size (mm):  7.0  Measured from:  Teeth  ETT to Teeth (cm):  21  Placement Verified by: auscultation and capnometry    Cormack-Lehane Classification:  Grade I - full view of glottis  Number of Attempts at Approach:  1

## 2021-05-04 NOTE — ANESTHESIA POSTPROCEDURE EVALUATION
Patient: Yas Ortiz    Procedure Summary     Date: 05/04/21 Room / Location: [facility] 07 / SURGERY [facility]    Anesthesia Start: 1214 Anesthesia Stop: 1426    Procedure: MINIMALLY INVASIVE REDO RIGHT LUMBAR 3 - 4 and 4 - 5 LAMINOTOMIES AND RIGHT LUMBAR 3 - 4 MICRODISCECTOMY (Spine Lumbar) Diagnosis:       Lumbar stenosis      Disc displacement, lumbar      (lumbar stenosis disc displacement)    Surgeons: Gerardo Johnson M.D. Responsible Provider: Akin Kenney M.D.    Anesthesia Type: general ASA Status: 2          Final Anesthesia Type: general  Last vitals  BP   Blood Pressure : 118/60    Temp   36.7 °C (98.1 °F)    Pulse   83   Resp   13    SpO2   94 %      Anesthesia Post Evaluation    Patient location during evaluation: PACU  Patient participation: complete - patient participated  Level of consciousness: awake and alert    Airway patency: patent  Anesthetic complications: no  Cardiovascular status: hemodynamically stable  Respiratory status: acceptable  Hydration status: euvolemic    PONV: none          No complications documented.     Nurse Pain Score: 2 (NPRS)

## 2021-05-04 NOTE — ANESTHESIA TIME REPORT
Anesthesia Start and Stop Event Times     Date Time Event    5/4/2021 0956 Ready for Procedure     1214 Anesthesia Start     1426 Anesthesia Stop        Responsible Staff  05/04/21    Name Role Begin End    Akin Kenney M.D. Anesth 1214 1426        Preop Diagnosis (Free Text):  Pre-op Diagnosis     LUMBAR STENOSIS, DISC DISPLACEMENT        Preop Diagnosis (Codes):  Diagnosis Information     Diagnosis Code(s): Lumbar stenosis [M48.061]     Disc displacement, lumbar [M51.26]        Post op Diagnosis  Lumbar stenosis      Premium Reason  Non-Premium    Comments:

## 2021-05-04 NOTE — OR SURGEON
Immediate Post OP Note    PreOp Diagnosis: L3-5 DDD, right radiculopathy.       PostOp Diagnosis: Same.       Procedure(s):  MINIMALLY INVASIVE REDO RIGHT LUMBAR 3 - 4 and 4 - 5 LAMINOTOMIES AND RIGHT LUMBAR 3 - 4 MICRODISCECTOMY - Wound Class: Clean with Drain    Surgeon(s):  Gerardo Johnson M.D.    Anesthesiologist/Type of Anesthesia:  Anesthesiologist: Akin Kenney M.D./General    Surgical Staff:  Assistant: Jose Weinstein P.A.-C.  Circulator: Rocio Centeno R.N.  Relief Circulator: Colleen Stallings R.N.  Scrub Person: Luzmaria Melendez  Radiology Technologist: Daily Robles    Specimens removed if any:  * No specimens in log *    Estimated Blood Loss: < 30 cc     Findings:  L3-5 DDD, see dictation.     Complications: None.          5/4/2021 2:34 PM Jose Weinstein P.A.-C.

## 2021-05-04 NOTE — OR NURSING
The pt is awake and oriented. Respirations are regular and easy. Pain is controlled, the pt is comfortable. Dressing with small amt drainage-marked.  Hemovac patent with sanguinous drainage in tubing.No void. No neuro deficits noted.

## 2021-05-04 NOTE — OP REPORT
DATE OF SERVICE:  05/04/2021     PREOPERATIVE DIAGNOSES:  1.  Right L4-L5 radiculopathies.  2.  Right L3-L4 and L4-L5 lateral recess and foraminal stenosis with right   L3-L4 and L4-L5 disk herniations.  3.  Failed conservative care.     POSTOPERATIVE DIAGNOSES:  1.  Right L4-L5 radiculopathies.  2.  Right L3-L4 and L4-L5 lateral recess and foraminal stenosis with right   L3-L4 and L4-L5 disk herniations.  3.  Failed conservative care.     PROCEDURES:  Minimally invasive approach with Lattus retractor system.  1.  Right L3 laminotomy and foraminotomy, decompression of right L3 nerve   root.  2.  Right L4 laminotomy and foraminotomy, decompression of right L4 nerve   root.  3.  Right L5 laminotomy and foraminotomy, decompression of right L5 nerve   root.  4.  Right L3 transpedicular approach far lateral decompression, right L3 nerve   root.  5.  Right L4 transpedicular approach far lateral decompression, right L4 nerve   root.  6.  Right L5 transpedicular approach far lateral decompression, right L5 nerve   root.  7.  Right L3-L4 and L4-L5 microdiskectomies.  8.  Microscope for microdissection of spinal canal.     SURGEON:  Gerardo Johnson MD, Neurosurgery and Spine Surgery.     ASSISTANT:  Jose Weinstein PA-C     ANESTHESIA:  General endotracheal anesthesia.     ANESTHESIOLOGIST:  Akin Kenney MD     COMPLICATIONS:  None.     ESTIMATED BLOOD LOSS:  Less than 50 mL.     PREOPERATIVE NOTE:  This extremely pleasant 76-year-old lady presents with   right lower extremity radicular leg pain, weakness and paresthesia consistent   right L4-L5 radiculopathies.  MRI showed right L4-L5 and L3-L4 lateral recess   and foraminal stenosis.  The patient underwent prior laminotomies in 2017, did   well for several years, now returns for further symptoms from progression of   the stenosis.  The patient failed physical therapy and epidural injections,   which gave her short-term relief.  Given the patient's neurological deficits,    pain and the MRI findings, I offered the patient redo exploration over the   nerve roots for redo decompression as described above.  I discussed surgical   procedure, alternatives, goals, risks, benefits, complications in detail with   the patient.  I used a bone model, MRI and educational handouts to assist the   patient with her decision.  Answered all questions to the best of my ability.    The patient understood and consented to surgery.     DESCRIPTION OF PROCEDURE:  The patient was brought to the operating room and   placed under general endotracheal anesthesia.  She was placed prone on the   operating OSI table with care taken about the bony prominences, peripheral   nerves.  Lumbar region was prepped and draped in the usual sterile fashion.    Following localization of cross table fluoroscopy, a small incision was made   right of midline and the fascia was incised.  I used the serial dilator tubes   for muscle splitting approach and docked on the right L4 pars.  Sequential   dilation was achieved and the Lattus retractor system was applied.  The blades   opened up allowing excellent exposure at L3-L5 level.  Intraoperative x-ray   confirmed appropriate levels.     Using Inderas Lenin AM-8 drillbit, Kerrison rongeurs and curettes, redo right L3   laminotomy and foraminotomy was completed, decompression of right L3 nerve   root.  Separately and distinctly, redo right L4 laminotomy and foraminotomy   was completed, decompression of right L4 nerve root.  Separately and   distinctly, redo right L5 laminotomy and foraminotomy was completed,   decompression of right L5 nerve root.  Given the amount of stenosis and   collapse, I drilled down the right L3 pedicle for transpedicular decompression   of right L3 nerve root.  Separately and distinctly, I drilled down the right   L4 pedicle for transpedicular decompression, right L4 nerve root.  Separately   and distinctly, I drilled down the right L5 pedicle for  transpedicular   decompression, right L5 nerve root.  This required extra degree of expertise,   effort and time, hence a modifier-59 is required for CPT code 63056-63057 x2.    The edges of bone was bone waxed.  Thrombin Gelfoam placed in epidural   gutters for hemostasis.  Microscope was used for microdissection of spinal   canal throughout.  Under the microscope, I protected the nerve roots.  I   incised the disk at L3-L4.  I then incised the disk at L4-L5.  I removed   several disk fragments to free up the whole thecal sac from any further   compression.  The wound was irrigated.  Immaculate hemostasis achieved.  Given   the amount of scarring, I could not safely place an epidural catheter hence I   infiltrated the muscle with long-acting Exparel analgesia for pain control.    I placed thrombin Gelfoam in the epidural gutters for hemostasis and   vancomycin powder in the wound.  I closed the wound over a MAMTA drain brought   through a separate incision with 0 Vicryl deep fascia, 2-0 Vicryl to deep   dermal layer, Steri-Strips to skin.  Small sterile dressing was applied.    Swabs, needles, instruments correct by two count.  No complications were   encountered.  The patient tolerated the procedure, stable and transferred to   recovery room.  The patient was doing well in recovery room, neurologically   intact and should do well given her age and the fact that she [place] of   town.  The patient will stay overnight and be discharged in the morning as an   outpatient.     INTRAOPERATIVE FINDINGS:  Severe stenosis, right side L3-L4 and L4-L5, which   was freed up extensively as described.  No complications were encountered.        ______________________________  MD IVIS MARIO/ANTHONY/NICHOLE    DD:  05/04/2021 14:50  DT:  05/04/2021 15:42    Job#:  [id number]    CC:Winston Parker DO

## 2021-05-05 VITALS
DIASTOLIC BLOOD PRESSURE: 64 MMHG | RESPIRATION RATE: 16 BRPM | SYSTOLIC BLOOD PRESSURE: 131 MMHG | OXYGEN SATURATION: 95 % | HEIGHT: 61 IN | BODY MASS INDEX: 27.47 KG/M2 | TEMPERATURE: 97 F | HEART RATE: 76 BPM | WEIGHT: 145.5 LBS

## 2021-05-05 PROCEDURE — 97165 OT EVAL LOW COMPLEX 30 MIN: CPT

## 2021-05-05 PROCEDURE — A9270 NON-COVERED ITEM OR SERVICE: HCPCS | Performed by: PHYSICIAN ASSISTANT

## 2021-05-05 PROCEDURE — 700101 HCHG RX REV CODE 250: Performed by: PHYSICIAN ASSISTANT

## 2021-05-05 PROCEDURE — 97161 PT EVAL LOW COMPLEX 20 MIN: CPT

## 2021-05-05 PROCEDURE — 700102 HCHG RX REV CODE 250 W/ 637 OVERRIDE(OP): Performed by: PHYSICIAN ASSISTANT

## 2021-05-05 PROCEDURE — G0378 HOSPITAL OBSERVATION PER HR: HCPCS

## 2021-05-05 PROCEDURE — 700111 HCHG RX REV CODE 636 W/ 250 OVERRIDE (IP): Performed by: PHYSICIAN ASSISTANT

## 2021-05-05 RX ORDER — CYCLOBENZAPRINE HCL 10 MG
10 TABLET ORAL EVERY 8 HOURS PRN
Qty: 45 TABLET | Refills: 0 | Status: SHIPPED | OUTPATIENT
Start: 2021-05-05 | End: 2021-05-20

## 2021-05-05 RX ORDER — SULFAMETHOXAZOLE AND TRIMETHOPRIM 800; 160 MG/1; MG/1
1 TABLET ORAL 2 TIMES DAILY
Qty: 10 TABLET | Refills: 0 | Status: SHIPPED | OUTPATIENT
Start: 2021-05-05 | End: 2021-05-10

## 2021-05-05 RX ORDER — OXYCODONE HYDROCHLORIDE 5 MG/1
5 TABLET ORAL EVERY 4 HOURS PRN
Qty: 84 TABLET | Refills: 0 | Status: SHIPPED | OUTPATIENT
Start: 2021-05-05 | End: 2021-05-19

## 2021-05-05 RX ADMIN — AMLODIPINE BESYLATE 2.5 MG: 2.5 TABLET ORAL at 06:33

## 2021-05-05 RX ADMIN — POTASSIUM CHLORIDE, DEXTROSE MONOHYDRATE AND SODIUM CHLORIDE: 150; 5; 450 INJECTION, SOLUTION INTRAVENOUS at 06:33

## 2021-05-05 RX ADMIN — DOCUSATE SODIUM 100 MG: 100 CAPSULE, LIQUID FILLED ORAL at 06:33

## 2021-05-05 RX ADMIN — ANTACID TABLETS 500 MG: 500 TABLET, CHEWABLE ORAL at 06:33

## 2021-05-05 RX ADMIN — CEFAZOLIN SODIUM 2 G: 2 INJECTION, SOLUTION INTRAVENOUS at 06:33

## 2021-05-05 ASSESSMENT — COGNITIVE AND FUNCTIONAL STATUS - GENERAL
DAILY ACTIVITIY SCORE: 24
SUGGESTED CMS G CODE MODIFIER DAILY ACTIVITY: CH
SUGGESTED CMS G CODE MODIFIER MOBILITY: CK
MOVING FROM LYING ON BACK TO SITTING ON SIDE OF FLAT BED: UNABLE
STANDING UP FROM CHAIR USING ARMS: A LITTLE
MOVING TO AND FROM BED TO CHAIR: A LITTLE
TURNING FROM BACK TO SIDE WHILE IN FLAT BAD: A LITTLE
WALKING IN HOSPITAL ROOM: A LITTLE
MOBILITY SCORE: 16
CLIMB 3 TO 5 STEPS WITH RAILING: A LITTLE

## 2021-05-05 ASSESSMENT — GAIT ASSESSMENTS
GAIT LEVEL OF ASSIST: SUPERVISED
ASSISTIVE DEVICE: FRONT WHEEL WALKER
DEVIATION: NO DEVIATION
DISTANCE (FEET): 200

## 2021-05-05 ASSESSMENT — PAIN DESCRIPTION - PAIN TYPE
TYPE: SURGICAL PAIN
TYPE: SURGICAL PAIN

## 2021-05-05 ASSESSMENT — ACTIVITIES OF DAILY LIVING (ADL): TOILETING: INDEPENDENT

## 2021-05-05 NOTE — PROGRESS NOTES
0730  Patient's sitting up in bed. Bedside report received from ANUEL Ambrosio RN at the beginning of the shift.       0735 REJI Villalba visited. POC discussed with the patient.     0819 Patient's sitting up in bed. Rates back pain, rates pain 3/10, tolerable per patient. Educated on the importance/use of IS at least 10x every hour while awake, able to reach 1500. IVF patent & infusing. Fall Protocol in effect. Call light within reach. Reminded patient to call for assist. Assessment completed. No distress noted. Plan of care received. Plan of care reviewed with the patient. Verbalized understanding.     0830 VANDANA Clifford worked with the patient.    0914 New order received and acknowledged for the patient to be dichcarged home from REJI Villalba.    1130 Hemovac drain dc'd as per order, covered with gauze, steri strips and tegaderm. No bleeding to the sie noted.     0925 SOFIA Braun worked with the patient.     1240  Discharge instructions given to the patient. Questions answered. Verbalized understanding. Patient was discharged with patient's belongings, prescriptions for Flexeril, Bactrim DS and Oxycodone via w/c accompanied by one female CNA & spouse via Private car.

## 2021-05-05 NOTE — THERAPY
"Occupational Therapy   Initial Evaluation     Patient Name: Yas Ortiz  Age:  76 y.o., Sex:  female  Medical Record #: [id number]  Today's Date: 5/5/2021     Precautions  Precautions: Spinal / Back Precautions   Comments: no brace per orders     Assessment  Patient is 76 y.o. female admitted for L3-5 minimally invasive laminectomy/foraminotomy/decompression. Pt normally independent with all ADLs and functional mobility without an AD at baseline. Pt able to complete all mobility and ADLs with supervision, educated on spinal precautions as well as adaptive strategies for lower body dressing as needed. Pt provided with educational handout following spine surgery, clear to discharge home when medically cleared. Patient will not be actively followed for occupational therapy services at this time, however may be seen if requested by physician for 1 more visit within 30 days to address any discharge or equipment needs.     Plan    Recommend Occupational Therapy for Evaluation only.    DC Equipment Recommendations: (P) None  Discharge Recommendations: (P) Anticipate that the patient will have no further occupational therapy needs after discharge from the hospital     Subjective    \"This was an easier spine surgery\"     Objective       05/05/21 0809   Prior Living Situation   Prior Services Home-Independent   Housing / Facility 1 Story House   Steps Into Home 4   Steps In Home 0   Rail Both Rail (Steps into Home)   Elevator No   Bathroom Set up Walk In Shower;Shower Chair   Equipment Owned Front-Wheel Walker;Tub / Shower Seat   Lives with - Patient's Self Care Capacity Spouse   Prior Level of ADL Function   Self Feeding Independent   Grooming / Hygiene Independent   Bathing Independent   Dressing Independent   Toileting Independent   Prior Level of IADL Function   Medication Management Independent   Laundry Independent   Kitchen Mobility Independent   Finances Independent   Home Management Independent   Shopping " Independent   Prior Level Of Mobility Independent Without Device in Community   Driving / Transportation Driving Independent   Occupation (Pre-Hospital Vocational) Retired Due To Age   History of Falls   History of Falls No   Precautions   Precautions Spinal / Back Precautions    Pain 0 - 10 Group   Therapist Pain Assessment Post Activity Pain Same as Prior to Activity;Nurse Notified;0   Cognition    Cognition / Consciousness WDL   Level of Consciousness Alert   Active ROM Upper Body   Active ROM Upper Body  WDL   Dominant Hand Right   Strength Upper Body   Upper Body Strength  WDL   Sensation Upper Body   Upper Extremity Sensation  WDL   Upper Body Muscle Tone   Upper Body Muscle Tone  WDL   Neurological Concerns   Neurological Concerns No   Coordination Upper Body   Coordination WDL   Balance Assessment   Sitting Balance (Static) Fair +   Sitting Balance (Dynamic) Fair +   Standing Balance (Static) Fair   Standing Balance (Dynamic) Fair   Weight Shift Sitting Good   Weight Shift Standing Fair   Comments w/ FWW   Bed Mobility    Supine to Sit Supervised   Scooting Supervised   Rolling Supervised   Comments education on log roll   ADL Assessment   Grooming Supervision;Standing   Upper Body Dressing Supervision   Lower Body Dressing Supervision   Toileting Supervision   How much help from another person does the patient currently need...   Putting on and taking off regular lower body clothing? 4   Bathing (including washing, rinsing, and drying)? 4   Toileting, which includes using a toilet, bedpan, or urinal? 4   Putting on and taking off regular upper body clothing? 4   Taking care of personal grooming such as brushing teeth? 4   Eating meals? 4   6 Clicks Daily Activity Score 24   Functional Mobility   Sit to Stand Supervised   Bed, Chair, Wheelchair Transfer Supervised   Toilet Transfers Supervised   Transfer Method Stand Step   Mobility bed mobility, bathroom mobility, up to chair   Comments w/ FWW   Visual  Perception   Visual Perception  WDL   Activity Tolerance   Sitting in Chair left seated in chair   Sitting Edge of Bed 5   Standing 10   Education Group   Education Provided Role of Occupational Therapist;Back Safety   Role of Occupational Therapist Patient Response Patient;Acceptance;Explanation   Back Safety Patient Response Patient;Acceptance;Explanation;Handout;Action Demonstration   Problem List   Problem List None   Interdisciplinary Plan of Care Collaboration   IDT Collaboration with  Nursing   Patient Position at End of Therapy Seated;Call Light within Reach;Tray Table within Reach;Phone within Reach   Collaboration Comments RN updated

## 2021-05-05 NOTE — PROGRESS NOTES
Mobility Progress Note    Surgery patient: yes  Date of surgery:5/4/2021  Ambulated 50 ft on day of surgery? (N/A if patient did not undergo surgery today): n/a  Number of times ambulated 50 feet or greater today: 4  Patient has been up to chair, edge of bed or HOB 90 degrees for all meals?: yes  Goal met? (goal is ambulating at least 50 feet 2 times on day shift, one time on night shift): yes  If patient did not meet mobility goal, why?: n/a

## 2021-05-05 NOTE — DISCHARGE INSTRUCTIONS
Sulfamethoxazole; Trimethoprim, SMX-TMP tablets  What is this medicine?  SULFAMETHOXAZOLE; TRIMETHOPRIM or SMX-TMP (suhl fuh meth OK jasmyne zohl; trye METH oh prim) is a combination of a sulfonamide antibiotic and a second antibiotic, trimethoprim. It is used to treat or prevent certain kinds of bacterial infections. It will not work for colds, flu, or other viral infections.  This medicine may be used for other purposes; ask your health care provider or pharmacist if you have questions.  COMMON BRAND NAME(S): Bacter-Aid DS, Bactrim, Bactrim DS, Septra, Septra DS  What should I tell my health care provider before I take this medicine?  They need to know if you have any of these conditions:  · anemia  · asthma  · being treated with anticonvulsants  · if you frequently drink alcohol containing drinks  · kidney disease  · liver disease  · low level of folic acid or glucose-6-phosphate dehydrogenase  · poor nutrition or malabsorption  · porphyria  · severe allergies  · thyroid disorder  · an unusual or allergic reaction to sulfamethoxazole, trimethoprim, sulfa drugs, other medicines, foods, dyes, or preservatives  · pregnant or trying to get pregnant  · breast-feeding  How should I use this medicine?  Take this medicine by mouth with a full glass of water. Follow the directions on the prescription label. Take your medicine at regular intervals. Do not take it more often than directed. Do not skip doses or stop your medicine early.  Talk to your pediatrician regarding the use of this medicine in children. Special care may be needed. This medicine has been used in children as young as 2 months of age.  Overdosage: If you think you have taken too much of this medicine contact a poison control center or emergency room at once.  NOTE: This medicine is only for you. Do not share this medicine with others.  What if I miss a dose?  If you miss a dose, take it as soon as you can. If it is almost time for your next dose, take only  that dose. Do not take double or extra doses.  What may interact with this medicine?  Do not take this medicine with any of the following medications:  · aminobenzoate potassium  · dofetilide  · metronidazole  This medicine may also interact with the following medications:  · ACE inhibitors like benazepril, enalapril, lisinopril, and ramipril  · birth control pills  · cyclosporine  · digoxin  · diuretics  · indomethacin  · medicines for diabetes  · methenamine  · methotrexate  · phenytoin  · potassium supplements  · pyrimethamine  · sulfinpyrazone  · tricyclic antidepressants  · warfarin  This list may not describe all possible interactions. Give your health care provider a list of all the medicines, herbs, non-prescription drugs, or dietary supplements you use. Also tell them if you smoke, drink alcohol, or use illegal drugs. Some items may interact with your medicine.  What should I watch for while using this medicine?  Tell your doctor or health care professional if your symptoms do not improve. Drink several glasses of water a day to reduce the risk of kidney problems.  Do not treat diarrhea with over the counter products. Contact your doctor if you have diarrhea that lasts more than 2 days or if it is severe and watery.  This medicine can make you more sensitive to the sun. Keep out of the sun. If you cannot avoid being in the sun, wear protective clothing and use a sunscreen. Do not use sun lamps or tanning beds/booths.  What side effects may I notice from receiving this medicine?  Side effects that you should report to your doctor or health care professional as soon as possible:  · allergic reactions like skin rash or hives, swelling of the face, lips, or tongue  · breathing problems  · fever or chills, sore throat  · irregular heartbeat, chest pain  · joint or muscle pain  · pain or difficulty passing urine  · red pinpoint spots on skin  · redness, blistering, peeling or loosening of the skin, including  inside the mouth  · unusual bleeding or bruising  · unusually weak or tired  · yellowing of the eyes or skin  Side effects that usually do not require medical attention (report to your doctor or health care professional if they continue or are bothersome):  · diarrhea  · dizziness  · headache  · loss of appetite  · nausea, vomiting  · nervousness  This list may not describe all possible side effects. Call your doctor for medical advice about side effects. You may report side effects to FDA at 4-950-JRZ-6426.  Where should I keep my medicine?  Keep out of the reach of children.  Store at room temperature between 20 to 25 degrees C (68 to 77 degrees F). Protect from light. Throw away any unused medicine after the expiration date.  NOTE: This sheet is a summary. It may not cover all possible information. If you have questions about this medicine, talk to your doctor, pharmacist, or health care provider.  © 2020 [employer] Standard (2014-07-25 14:38:26)    Laminectomy - Laminotomy - Discectomy  Your surgeon has decided that a laminectomy (entire lamina removal) or laminotomy (partial lamina removal) is the best treatment for your back problem. These procedures involve removal of bone to relieve pressure on nerve roots. It allows the surgeon access to parts of the spine where other problems are located. This could be an injured disc (the cartilage-like structures located between the bones of the back). In this surgery your surgeon removes a part of the efrain arch that surrounds your spinal canal. This may be compressing nerve roots. In some cases, the surgeon will remove the disc and fuse (stick together) vertebral bodies (the bones of your back) to make the spine more stable. The type of procedure you will need is usually decided prior to surgery, however modifications may be necessary. The time in surgery depends on the findings in surgery and the procedure necessary to correct the problems.  DISCECTOMY  For people  with disc problems, the surgeon removes the portion of the disc that is causing the pressure on the nerve root. Some surgeons perform a micro (small) discectomy, which may require removal of only a small portion of the lamina. A disc nucleus (center) may also be removed either through a needle (percutaneous discectomy) or by injecting an enzyme called chymopapain into the disc. Chymopapain is an enzyme that dissolves the disc. For people with back instability, the surgeon fuses vertebrae that are next to each other with tiny pieces of bone. These are used as bone grafts on the facets, or between the vertebrae. When this heals, the bones will no longer be able to move. These bone chips are often taken from the pelvic bones. Bones and bone grafts grow into one unit, stabilizing the segments of the spinal column.  LET YOUR CAREGIVER KNOW ABOUT:  · Allergies.  · Medicines taken including herbs, eyedrops, over-the-counter medicines, and creams.  · Use of steroids (by mouth or creams).  · Previous problems with anesthetics or numbing medicine.  · Possibility of pregnancy, if this applies.  · History of blood clots (thrombophlebitis).  · History of bleeding or blood problems.  · Previous surgery.  · Other health problems.  RISKS AND COMPLICATIONS  Your caregiver will discuss possible risks and complications with you before surgery. In addition to the usual risks of anesthesia, other common risks and complications include:  · Blood loss and replacement.  · Temporary increase in pain due to surgery.  · Uncorrected back pain.  · Infection.  · New nerve damage (tingling, numbness, and pain).  BEFORE THE PROCEDURE  · Stop smoking at least 1 week prior to surgery. This lowers risk during surgery.  · Your caregiver may advise that you stop taking certain medicines that may affect the outcome of the surgery and your ability to heal. For example, you may need to stop taking anti-inflammatories, such as aspirin, because of possible  bleeding problems. Other medicines may have interactions with anesthesia.  · Tell your caregiver if you have been on steroids for long periods of time. Often, additional steroids are administered intravenously before and during the procedure to prevent complications.  · You should be present 60 minutes prior to your procedure or as directed.  AFTER THE PROCEDURE  After surgery, you will be taken to the recovery area where a nurse will watch and check your progress. Generally, you will be allowed to go home within 1 week barring other problems.  HOME CARE INSTRUCTIONS   · Check the surgical cut (incision) twice a day for signs of infection. Some signs may include a bad smelling, greenish or yellowish discharge from the wound; increased pain or increased redness over the incision site; an opening of the incision; flu-like symptoms; or a temperature above 101.5° F (38.6° C).  · Change your bandages in about 24 to 36 hours following surgery or as directed.  · You may shower once the bandage is removed or as directed. Avoid bathtubs, swimming pools, and hot tubs for 3 weeks or until your incision has healed completely. If you have stitches (sutures) or staples they may be removed 2 to 3 weeks after surgery, or as directed by your caregiver.  · Follow your caregiver's instructions for activities, exercises, and physical therapy.  · Weight reduction may be beneficial if you are overweight.  · Walking is permitted. You may use a treadmill without an incline. Cut down on activities if you have discomfort. You may also go up and down stairs as tolerated.  · Do not lift anything heavier than 10 to 15 pounds. Avoid bending or twisting at the waist. Always bend your knees.  · Maintain strength and range of motion as instructed.  · No driving is permitted for 2 to 3 weeks, or as directed by your caregiver. You may be a passenger for 20 to 30 minute trips. Lying back in the passenger seat may be more comfortable for you.  · Limit  your sitting to 20 to 30 minute intervals. You should lie down or walk in between sitting periods. There are no limitations for sitting in a recliner chair.  · Only take over-the-counter or prescription medicines for pain, discomfort, or fever as directed by your caregiver.  SEEK MEDICAL CARE IF:   · There is increased bleeding (more than a small spot) from the wound.  · You notice redness, swelling, or increasing pain in the wound.  · Pus is coming from the wound.  · An unexplained oral temperature above 102° F (38.9° C) develops.  · You notice a bad smell coming from the wound or dressing.  SEEK IMMEDIATE MEDICAL CARE IF:   · You develop a rash.  · You have difficulty breathing.  · You have any allergic problems.  Document Released: 12/15/2001 Document Revised: 03/11/2013 Document Reviewed: 10/13/2009  [employer]® Patient Information ©2014 [employer].        Discharge Instructions    Discharged to home by car with relative. Discharged via wheelchair, hospital escort: Yes.  Special equipment needed: Not Applicable    Be sure to schedule a follow-up appointment with your primary care doctor or any specialists as instructed.     Discharge Plan:        I understand that a diet low in cholesterol, fat, and sodium is recommended for good health. Unless I have been given specific instructions below for another diet, I accept this instruction as my diet prescription.   Other diet: Regular    Special Instructions:     No bending, lifting, twisting as per instructed.  Follow up with Dr Johnson, call for appointment.   No driving while on narcotics.  Patient will discharge home today after drain is removed. Patient is to follow-up with [facility] in 2 weeks for wound check. She is to call [facility] if the concerns arise or symptoms worsen at [phone]. Activity restrictions and wound care as per preop instruction sheet. No driving for 2 weeks. Patient may shower but no baths. No lifting more than 10 pounds from the  ground and no overhead work. Resume home medications. Add oxycodone 5 mg 1 p.o. every 4 to 6 hours as needed pain #84 with no refills for total of 14 days.' Add Flexeril 10 mg 1 p.o. 3 times daily as needed spasm #90 with no refills for 30 days.        · Is patient discharged on Warfarin / Coumadin?   No     Depression / Suicide Risk    As you are discharged from this [facility] facility, it is important to learn how to keep safe from harming yourself.    Recognize the warning signs:  · Abrupt changes in personality, positive or negative- including increase in energy   · Giving away possessions  · Change in eating patterns- significant weight changes-  positive or negative  · Change in sleeping patterns- unable to sleep or sleeping all the time   · Unwillingness or inability to communicate  · Depression  · Unusual sadness, discouragement and loneliness  · Talk of wanting to die  · Neglect of personal appearance   · Rebelliousness- reckless behavior  · Withdrawal from people/activities they love  · Confusion- inability to concentrate     If you or a loved one observes any of these behaviors or has concerns about self-harm, here's what you can do:  · Talk about it- your feelings and reasons for harming yourself  · Remove any means that you might use to hurt yourself (examples: pills, rope, extension cords, firearm)  · Get professional help from the community (Mental Health, Substance Abuse, psychological counseling)  · Do not be alone:Call your Safe Contact- someone whom you trust who will be there for you.  · Call your local CRISIS HOTLINE 061-0294 or 142-739-2519  · Call your local Children's Mobile Crisis Response Team [place] (860) 301-1325 or www.Connecture  · Call the toll free National Suicide Prevention Hotlines   · National Suicide Prevention Lifeline 980-826-JOTA (5436)  · National Hope Line Network 800-SUICIDE (081-9945)    Oxycodone tablets or capsules  What is this medicine?  OXYCODONE (ox  haylee MICHAEL done) is a pain reliever. It is used to treat moderate to severe pain.  This medicine may be used for other purposes; ask your health care provider or pharmacist if you have questions.  COMMON BRAND NAME(S): Dazidox, Endocodone, Oxaydo, OXECTA, OxyIR, Percolone, Roxicodone, Roxybond  What should I tell my health care provider before I take this medicine?  They need to know if you have any of these conditions:  · Nelson's disease  · brain tumor  · head injury  · heart disease  · history of drug or alcohol abuse problem  · if you often drink alcohol  · kidney disease  · liver disease  · lung or breathing disease, like asthma  · mental illness  · pancreatic disease  · seizures  · thyroid disease  · an unusual or allergic reaction to oxycodone, codeine, hydrocodone, morphine, other medicines, foods, dyes, or preservatives  · pregnant or trying to get pregnant  · breast-feeding  How should I use this medicine?  Take this medicine by mouth with a glass of water. Follow the directions on the prescription label. You can take it with or without food. If it upsets your stomach, take it with food. Take your medicine at regular intervals. Do not take it more often than directed. Do not stop taking except on your doctor's advice.  Some brands of this medicine, like Oxecta, have special instructions. Ask your doctor or pharmacist if these directions are for you: Do not cut, crush or chew this medicine. Swallow only one tablet at a time. Do not wet, soak, or lick the tablet before you take it.  A special MedGuide will be given to you by the pharmacist with each prescription and refill. Be sure to read this information carefully each time.  Talk to your pediatrician regarding the use of this medicine in children. Special care may be needed.  Overdosage: If you think you have taken too much of this medicine contact a poison control center or emergency room at once.  NOTE: This medicine is only for you. Do not share this  medicine with others.  What if I miss a dose?  If you miss a dose, take it as soon as you can. If it is almost time for your next dose, take only that dose. Do not take double or extra doses.  What may interact with this medicine?  This medicine may interact with the following medications:  · alcohol  · antihistamines for allergy, cough and cold  · antiviral medicines for HIV or AIDS  · atropine  · certain antibiotics like clarithromycin, erythromycin, linezolid, rifampin  · certain medicines for anxiety or sleep  · certain medicines for bladder problems like oxybutynin, tolterodine  · certain medicines for depression like amitriptyline, fluoxetine, sertraline  · certain medicines for fungal infections like ketoconazole, itraconazole, voriconazole  · certain medicines for migraine headache like almotriptan, eletriptan, frovatriptan, naratriptan, rizatriptan, sumatriptan, zolmitriptan  · certain medicines for nausea or vomiting like dolasetron, ondansetron, palonosetron  · certain medicines for Parkinson's disease like benztropine, trihexyphenidyl  · certain medicines for seizures like phenobarbital, phenytoin, primidone  · certain medicines for stomach problems like dicyclomine, hyoscyamine  · certain medicines for travel sickness like scopolamine  · diuretics  · general anesthetics like halothane, isoflurane, methoxyflurane, propofol  · ipratropium  · local anesthetics like lidocaine, pramoxine, tetracaine  · MAOIs like Carbex, Eldepryl, Marplan, Nardil, and Parnate  · medicines that relax muscles for surgery  · methylene blue  · nilotinib  · other narcotic medicines for pain or cough  · phenothiazines like chlorpromazine, mesoridazine, prochlorperazine, thioridazine  This list may not describe all possible interactions. Give your health care provider a list of all the medicines, herbs, non-prescription drugs, or dietary supplements you use. Also tell them if you smoke, drink alcohol, or use illegal drugs. Some  items may interact with your medicine.  What should I watch for while using this medicine?  Tell your doctor or health care professional if your pain does not go away, if it gets worse, or if you have new or a different type of pain. You may develop tolerance to the medicine. Tolerance means that you will need a higher dose of the medicine for pain relief. Tolerance is normal and is expected if you take this medicine for a long time.  Do not suddenly stop taking your medicine because you may develop a severe reaction. Your body becomes used to the medicine. This does NOT mean you are addicted. Addiction is a behavior related to getting and using a drug for a non-medical reason. If you have pain, you have a medical reason to take pain medicine. Your doctor will tell you how much medicine to take. If your doctor wants you to stop the medicine, the dose will be slowly lowered over time to avoid any side effects.  There are different types of narcotic medicines (opiates). If you take more than one type at the same time or if you are taking another medicine that also causes drowsiness, you may have more side effects. Give your health care provider a list of all medicines you use. Your doctor will tell you how much medicine to take. Do not take more medicine than directed. Call emergency for help if you have problems breathing or unusual sleepiness.  You may get drowsy or dizzy. Do not drive, use machinery, or do anything that needs mental alertness until you know how the medicine affects you. Do not stand or sit up quickly, especially if you are an older patient. This reduces the risk of dizzy or fainting spells. Alcohol may interfere with the effect of this medicine. Avoid alcoholic drinks.  This medicine will cause constipation. Try to have a bowel movement at least every 2 to 3 days. If you do not have a bowel movement for 3 days, call your doctor or health care professional.  Your mouth may get dry. Chewing sugarless  gum or sucking hard candy, and drinking plenty of water may help. Contact your doctor if the problem does not go away or is severe.  What side effects may I notice from receiving this medicine?  Side effects that you should report to your doctor or health care professional as soon as possible:  · allergic reactions like skin rash, itching or hives, swelling of the face, lips, or tongue  · breathing problems  · confusion  · signs and symptoms of low blood pressure like dizziness; feeling faint or lightheaded, falls; unusually weak or tired  · trouble passing urine or change in the amount of urine  · trouble swallowing  Side effects that usually do not require medical attention (report to your doctor or health care professional if they continue or are bothersome):  · constipation  · dry mouth  · nausea, vomiting  · tiredness  This list may not describe all possible side effects. Call your doctor for medical advice about side effects. You may report side effects to FDA at 1-062-WOU-7134.  Where should I keep my medicine?  Keep out of the reach of children. This medicine can be abused. Keep your medicine in a safe place to protect it from theft. Do not share this medicine with anyone. Selling or giving away this medicine is dangerous and against the law.  Store at room temperature between 15 and 30 degrees C (59 and 86 degrees F). Protect from light. Keep container tightly closed.  This medicine may cause harm and death if it is taken by other adults, children, or pets. Return medicine that has not been used to an official disposal site. Contact the [facility] at [phone] or your [facility] to find a site. If you cannot return the medicine, flush it down the toilet. Do not use the medicine after the expiration date.  NOTE: This sheet is a summary. It may not cover all possible information. If you have questions about this medicine, talk to your doctor, pharmacist, or health care provider.  © 2020  [employer]vier/Gold Standard (2018-04-24 16:13:10)  Cyclobenzaprine tablets  What is this medicine?  CYCLOBENZAPRINE (sye kloe LUCY za preen) is a muscle relaxer. It is used to treat muscle pain, spasms, and stiffness.  This medicine may be used for other purposes; ask your health care provider or pharmacist if you have questions.  COMMON BRAND NAME(S): Fexmid, Flexeril  What should I tell my health care provider before I take this medicine?  They need to know if you have any of these conditions:  · heart disease, irregular heartbeat, or previous heart attack  · liver disease  · thyroid problem  · an unusual or allergic reaction to cyclobenzaprine, tricyclic antidepressants, lactose, other medicines, foods, dyes, or preservatives  · pregnant or trying to get pregnant  · breast-feeding  How should I use this medicine?  Take this medicine by mouth with a glass of water. Follow the directions on the prescription label. If this medicine upsets your stomach, take it with food or milk. Take your medicine at regular intervals. Do not take it more often than directed.  Talk to your pediatrician regarding the use of this medicine in children. Special care may be needed.  Overdosage: If you think you have taken too much of this medicine contact a poison control center or emergency room at once.  NOTE: This medicine is only for you. Do not share this medicine with others.  What if I miss a dose?  If you miss a dose, take it as soon as you can. If it is almost time for your next dose, take only that dose. Do not take double or extra doses.  What may interact with this medicine?  Do not take this medicine with any of the following medications:  · MAOIs like Carbex, Eldepryl, Marplan, Nardil, and Parnate  · narcotic medicines for cough  · safinamide  This medicine may also interact with the following medications:  · alcohol  · bupropion  · antihistamines for allergy, cough and cold  · certain medicines for anxiety or sleep  · certain  medicines for bladder problems like oxybutynin, tolterodine  · certain medicines for depression like amitriptyline, fluoxetine, sertraline  · certain medicines for Parkinson's disease like benztropine, trihexyphenidyl  · certain medicines for seizures like phenobarbital, primidone  · certain medicines for stomach problems like dicyclomine, hyoscyamine  · certain medicines for travel sickness like scopolamine  · general anesthetics like halothane, isoflurane, methoxyflurane, propofol  · ipratropium  · local anesthetics like lidocaine, pramoxine, tetracaine  · medicines that relax muscles for surgery  · narcotic medicines for pain  · phenothiazines like chlorpromazine, mesoridazine, prochlorperazine, thioridazine  · verapamil  This list may not describe all possible interactions. Give your health care provider a list of all the medicines, herbs, non-prescription drugs, or dietary supplements you use. Also tell them if you smoke, drink alcohol, or use illegal drugs. Some items may interact with your medicine.  What should I watch for while using this medicine?  Tell your doctor or health care professional if your symptoms do not start to get better or if they get worse.  You may get drowsy or dizzy. Do not drive, use machinery, or do anything that needs mental alertness until you know how this medicine affects you. Do not stand or sit up quickly, especially if you are an older patient. This reduces the risk of dizzy or fainting spells. Alcohol may interfere with the effect of this medicine. Avoid alcoholic drinks.  If you are taking another medicine that also causes drowsiness, you may have more side effects. Give your health care provider a list of all medicines you use. Your doctor will tell you how much medicine to take. Do not take more medicine than directed. Call emergency for help if you have problems breathing or unusual sleepiness.  Your mouth may get dry. Chewing sugarless gum or sucking hard candy, and  drinking plenty of water may help. Contact your doctor if the problem does not go away or is severe.  What side effects may I notice from receiving this medicine?  Side effects that you should report to your doctor or health care professional as soon as possible:  · allergic reactions like skin rash, itching or hives, swelling of the face, lips, or tongue  · breathing problems  · chest pain  · fast, irregular heartbeat  · hallucinations  · seizures  · unusually weak or tired  Side effects that usually do not require medical attention (report to your doctor or health care professional if they continue or are bothersome):  · headache  · nausea, vomiting  This list may not describe all possible side effects. Call your doctor for medical advice about side effects. You may report side effects to FDA at 8-834-FDA-3350.  Where should I keep my medicine?  Keep out of the reach of children.  Store at room temperature between 15 and 30 degrees C (59 and 86 degrees F). Keep container tightly closed. Throw away any unused medicine after the expiration date.  NOTE: This sheet is a summary. It may not cover all possible information. If you have questions about this medicine, talk to your doctor, pharmacist, or health care provider.  © 2020 [employer]vier/Gold Standard (2019-11-20 12:49:26)

## 2021-05-05 NOTE — THERAPY
Physical Therapy   Initial Evaluation     Patient Name: Yas Ortiz  Age:  76 y.o., Sex:  female  Medical Record #: [id number]  Today's Date: 5/5/2021     Precautions: Spinal / Back Precautions     Assessment  Patient is 76 y.o. female presents with lumbar stenosis and RLE radiculopathy and low back pain s/p L 3-5 minimally invasive laminectomy/faraminotomy/decompression on 5/4/21. Pt educated on precautions after surgery and mobilized as below demonstrating safe mobility. Pt reports her RLE symptoms have resolved and she only has slight low back pain with mobility. Pt has no further acute PT needs.     Plan    Recommend Physical Therapy for Evaluation only.    DC Equipment Recommendations: None(pt already owns FWW)  Discharge Recommendations: Anticipate that the patient will have no further physical therapy needs after discharge from the hospital.     Objective       05/05/21 0928   Prior Living Situation   Prior Services Home-Independent; Lives with spouse   Housing / Facility 1 Story House   Steps Into Home 4   Steps In Home 0   Rail Both Rail (Steps into Home)   Equipment Owned Front-Wheel Walker   Prior Level of Functional Mobility   Bed Mobility Independent   Transfer Status Independent   Ambulation Independent   Distance Ambulation (Feet)   (community)   Assistive Devices Used None   Stairs Independent   History of Falls   History of Falls No   Cognition    Cognition / Consciousness WDL   Level of Consciousness Alert   Comments pleasant and cooperative    Passive ROM Lower Body   Passive ROM Lower Body WDL   Active ROM Lower Body    Active ROM Lower Body  WDL   Strength Lower Body   Lower Body Strength  WDL   Sensation Lower Body   Lower Extremity Sensation   WDL   Lower Body Muscle Tone   Lower Body Muscle Tone  WDL   Coordination Lower Body    Coordination Lower Body  WDL   Balance Assessment   Sitting Balance (Static) Fair +   Sitting Balance (Dynamic) Fair +   Standing Balance (Static) Fair   Standing  Balance (Dynamic) Fair   Weight Shift Sitting Good   Weight Shift Standing Fair   Comments w/ FWW   Gait Analysis   Gait Level Of Assist Supervised   Assistive Device Front Wheel Walker   Distance (Feet) 200   # of Times Distance was Traveled 1   Deviation No deviation   # of Stairs Climbed 4   Level of Assist with Stairs Supervised   Weight Bearing Status FWB   Bed Mobility    Supine to Sit   (NT; pt found up in chair )   Sit to Supine   (NT; pt left up in chair for breakfast)   Comments educated on log roll, but had already performed with OT and was up in chair    Functional Mobility   Sit to Stand Supervised   Bed, Chair, Wheelchair Transfer Supervised   Activity Tolerance   Sitting in Chair started/ended session in chair   Sitting Edge of Bed NT   Standing 8mins   Education Group   Education Provided Role of Physical Therapist;Spine Precautions;Gait Training   Spine Precautions Patient Response Patient;Acceptance;Explanation;Demonstration;Handout;Verbal Demonstration;Action Demonstration   Role of Physical Therapist Patient Response Patient;Acceptance;Explanation;Demonstration;Verbal Demonstration;Action Demonstration   Gait Training Patient Response Patient;Acceptance;Explanation;Demonstration;Verbal Demonstration;Action Demonstration

## 2021-05-05 NOTE — PROGRESS NOTES
2 RN skin check with JUSTO Aguilar.     Posterior back surgical incision-Dressing bloody, Hemovac in place. No other areas of concern.

## 2021-05-05 NOTE — CARE PLAN
Problem: Safety  Goal: Will remain free from injury  Outcome: PROGRESSING AS EXPECTED  Note: Safety precaution in effect. Non skid socks in use. Call light within reach. Reminded patient to call for assist. Hourly rounds in effect. Verbalized understanding.     Problem: Infection  Goal: Will remain free from infection  Outcome: PROGRESSING AS EXPECTED  Note: Implement standard precaution. Hand washing every encounter & before & after patient care. Verbalized understanding.     Problem: Knowledge Deficit  Goal: Knowledge of disease process/condition, treatment plan, diagnostic tests, and medications will improve  Outcome: PROGRESSING AS EXPECTED  Note: Discussed Plan of care. PT & OT Eal. Discharge instructions given. Questions answered. Verbalized understanding.      Problem: Pain Management  Goal: Pain level will decrease to patient's comfort goal  Outcome: PROGRESSING AS EXPECTED  Note: Educated on pain scale. Encouraged to verbalize pain. Will medicate as per MAR.

## 2021-05-05 NOTE — DISCHARGE SUMMARY
Admission: May 4, 2021    Discharge: May 5, 2021    Diagnosis: L3-4 and L4-5 lumbar spondylosis and stenosis with radiculopathy    Admitting MD: Dr. Gerardo Johnson    Procedure: Right side approach L34 and L45 MIS laminotomy and foraminotomy with microdiscectomies    Course: Yas Moreno is an extremely pleasant 76-year-old lady presents with right lower extremity radicular leg pain, weakness and paresthesia consistent right L4-L5 radiculopathies.  MRI showed right L4-L5 and L3-L4 lateral recess and foraminal stenosis.  The patient underwent prior laminotomies in 2017, did well for several years, now returns for further symptoms from progression of the stenosis.  The patient failed physical therapy and epidural injections, which gave her short-term relief.  Given the patient's neurological deficits, pain and the MRI findings, I offered the patient redo exploration over the nerve roots for redo decompression as described above.  I discussed surgical   procedure, alternatives, goals, risks, benefits, complications in detail with the patient.  I used a bone model, MRI and educational handouts to assist the patient with her decision.  Answered all questions to the best of my ability.  The patient understood and consented to surgery.    Patient was admitted hospital and taken the operating room with Dr. Johnson for a right-sided L4-5 and L3-4 MIS laminotomy and foraminotomy with microdiscectomies.  Surgery went well and without complications.  Patient did have a fair amount of scar tissue and Dr. Johnson was unable to do the usual epidural injection he does at the time of surgery due to scar tissue, but otherwise surgery went as planned. Patient was transferred to the PACU and then spent the night on the neurosurgery floor where she had her pain managed in the drain was monitored.  Patient's drain was at 10 cc since coming out of surgery.  She has been ambulating in the room to the bathroom feels ready to go home.  Per  nursing Dr. Johnson wanted to leave the drain in until this afternoon.  We will recheck the drain at noon today and as long as it is below 30 cc we will remove it today and discharged home.  The patient lives in [place] so it would be hard for her to come back in tomorrow to remove the drain.      Patient will discharge home today after drain is removed..  Patient is to follow-up with [facility] in 2 weeks for wound check..  She is to call [facility] if the concerns arise or symptoms worsen at [phone].  Activity restrictions and wound care as per preop instruction sheet.  No driving for 2 weeks.  Patient may shower but no baths.  No lifting more than 10 pounds from the ground and no overhead work.  Resume home medications.  Add oxycodone 5 mg 1 p.o. every 4 to 6 hours as needed pain #84 with no refills for total of 14 days.'  Add Flexeril 10 mg 1 p.o. 3 times daily as needed spasm #90 with no refills for 30 days.

## 2021-05-05 NOTE — PROGRESS NOTES
Neurosurgery Progress Note    Subjective:  Postop day #1 status post right side approach L4-5 and L3-4 laminotomies with bilateral foraminotomies and microdiscectomies.  Patient doing very well this morning with pain well-controlled.    Her leg pain is completely gone.    Her drain is down to 10 cc since coming out of surgery.  Patient is been ambulating in the room and on the floor.  Good strength in her legs..  Patient feels ready to go home.    Exam:  Wound is clean dry intact  Leg strength is 5/5 with sensory intact.  DTRs are +2    BP  Min: 103/70  Max: 150/65  Pulse  Av.2  Min: 67  Max: 97  Resp  Avg: 15  Min: 11  Max: 18  Temp  Av.4 °C (97.6 °F)  Min: 35.8 °C (96.5 °F)  Max: 37.1 °C (98.7 °F)  SpO2  Av %  Min: 90 %  Max: 100 %    No data recorded                      Intake/Output       21 - 21 0621 - 21 0659       2906-7997 Total  8329-7017 Total       Intake    P.O.  400  -- 400  --  -- --    P.O. 400 -- 400 -- -- --    I.V.  1600  -- 1600  --  -- --    Volume (mL) (lactated ringers infusion) 100 -- 100 -- -- --    Volume (mL) (lactated ringers infusion) 1500 -- 1500 -- -- --    Total Intake  -- 2000 -- -- --       Output    Urine  --  -- --  --  -- --    Number of Times Voided 1 x -- 1 x -- -- --    Drains  --  10 10  --  -- --    Output (mL) (Closed/Suction Drain 1 Posterior Back Hemovac) -- 10 10 -- -- --    Stool  --  -- --  --  -- --    Number of Times Stooled -- 0 x 0 x -- -- --    Blood  200  -- 200  --  -- --    Est. Blood Loss 200 -- 200 -- -- --    Total Output 200 10 210 -- -- --       Net I/O     1800 -10 1790 -- -- --            Intake/Output Summary (Last 24 hours) at 2021 0900  Last data filed at 2021 0500  Gross per 24 hour   Intake 2000 ml   Output 210 ml   Net 1790 ml            • amLODIPine  2.5 mg DAILY   • atorvastatin  10 mg DAILY AT 1800   • Pharmacy Consult Request  1 Each PHARMACY TO DOSE   • MD  ALERT...DO NOT ADMINISTER NSAIDS or ASPIRIN unless ORDERED By Neurosurgery  1 Each PRN   • docusate sodium  100 mg BID   • senna-docusate  1 tablet Nightly   • senna-docusate  1 tablet Q24HRS PRN   • polyethylene glycol/lytes  1 Packet BID PRN   • magnesium hydroxide  30 mL QDAY PRN   • bisacodyl  10 mg Q24HRS PRN   • fleet  1 Each Once PRN   • dextrose 5 % and 0.45 % NaCl with KCl 20 mEq   Continuous   • diphenhydrAMINE  25 mg Q6HRS PRN    Or   • diphenhydrAMINE  25 mg Q6HRS PRN   • ondansetron  4 mg Q4HRS PRN   • ondansetron  4 mg Q4HRS PRN   • cyclobenzaprine  10 mg Q8HRS PRN   • ALPRAZolam  0.25 mg BID PRN   • labetalol  10 mg Q HOUR PRN   • benzocaine-menthol  1 Lozenge Q2HRS PRN   • calcium carbonate  500 mg BID   • HYDROcodone/acetaminophen  1 tablet Q4HRS PRN   • oxyCODONE immediate-release  5 mg Q4HRS PRN   • HYDROmorphone  0.5 mg Q3HRS PRN       Assessment and Plan:  Postop day #1 status post right side approach L4-5 and L3-4 laminotomies with bilateral foraminotomies and microdiscectomies.  Patient is doing quite well with minimal back pain and resolution of leg pain.  She has been ambulating in the floor and feels strong.  Drain output is only 10 cc since the surgery ended.  Patient feels ready to go home  Please remove lumbar drain, a Steri-Strip over drain site and gauze Tegaderm dressing over the drain site and surgical for discharge home today  Patient will discharge home today after drain is removed..  Patient is to follow-up with [facility] in 2 weeks for wound check..  She is to call [facility] if the concerns arise or symptoms worsen at [phone].  Activity restrictions and wound care as per preop instruction sheet.  No driving for 2 weeks.  Patient may shower but no baths.  No lifting more than 10 pounds from the ground and no overhead work.  Resume home medications.  Add oxycodone 5 mg 1 p.o. every 4 to 6 hours as needed pain #84 with no refills for total of 14 days.'  Add Flexeril 10 mg 1  p.o. 3 times daily as needed spasm #90 with no refills for 30 days.

## 2021-05-05 NOTE — OR NURSING
The pt was given an incentive spirometer. Instructed on its use and benefits. Pt can raise the blue chamber to 2000 ml    Pt transported to floor with oxygen The oxygen tank is greater than 50% full upon inspection.    Floor orders were reviewed with receiving RN.

## 2021-07-26 ENCOUNTER — APPOINTMENT (OUTPATIENT)
Dept: RADIOLOGY | Facility: MEDICAL CENTER | Age: 76
End: 2021-07-26
Attending: FAMILY MEDICINE
Payer: MEDICARE

## 2021-08-17 ENCOUNTER — APPOINTMENT (OUTPATIENT)
Dept: RADIOLOGY | Facility: MEDICAL CENTER | Age: 76
End: 2021-08-17
Attending: FAMILY MEDICINE
Payer: MEDICARE

## 2021-09-01 ENCOUNTER — HOSPITAL ENCOUNTER (OUTPATIENT)
Dept: RADIOLOGY | Facility: MEDICAL CENTER | Age: 76
End: 2021-09-01
Attending: FAMILY MEDICINE
Payer: MEDICARE

## 2021-09-01 DIAGNOSIS — Z12.31 VISIT FOR SCREENING MAMMOGRAM: ICD-10-CM

## 2021-09-01 PROCEDURE — 77063 BREAST TOMOSYNTHESIS BI: CPT | Mod: 52

## 2022-02-02 PROBLEM — M16.11 PRIMARY LOCALIZED OSTEOARTHRITIS OF RIGHT HIP: Status: ACTIVE | Noted: 2022-02-02

## 2022-04-06 ENCOUNTER — PRE-ADMISSION TESTING (OUTPATIENT)
Dept: ADMISSIONS | Facility: MEDICAL CENTER | Age: 77
End: 2022-04-06
Attending: ORTHOPAEDIC SURGERY
Payer: MEDICARE

## 2022-04-06 DIAGNOSIS — Z01.818 PRE-OP TESTING: ICD-10-CM

## 2022-04-06 DIAGNOSIS — M16.11 PRIMARY OSTEOARTHRITIS OF RIGHT HIP: ICD-10-CM

## 2022-04-06 DIAGNOSIS — Z01.810 PRE-OPERATIVE CARDIOVASCULAR EXAMINATION: ICD-10-CM

## 2022-04-06 DIAGNOSIS — Z01.812 PRE-OPERATIVE LABORATORY EXAMINATION: ICD-10-CM

## 2022-04-06 LAB
ANION GAP SERPL CALC-SCNC: 15 MMOL/L (ref 7–16)
BUN SERPL-MCNC: 16 MG/DL (ref 8–22)
CALCIUM SERPL-MCNC: 9.8 MG/DL (ref 8.5–10.5)
CHLORIDE SERPL-SCNC: 99 MMOL/L (ref 96–112)
CO2 SERPL-SCNC: 24 MMOL/L (ref 20–33)
CREAT SERPL-MCNC: 0.77 MG/DL (ref 0.5–1.4)
EKG IMPRESSION: NORMAL
ERYTHROCYTE [DISTWIDTH] IN BLOOD BY AUTOMATED COUNT: 40 FL (ref 35.9–50)
GFR SERPLBLD CREATININE-BSD FMLA CKD-EPI: 79 ML/MIN/1.73 M 2
GLUCOSE SERPL-MCNC: 86 MG/DL (ref 65–99)
HCT VFR BLD AUTO: 43.5 % (ref 37–47)
HGB BLD-MCNC: 14.8 G/DL (ref 12–16)
MCH RBC QN AUTO: 29.3 PG (ref 27–33)
MCHC RBC AUTO-ENTMCNC: 34 G/DL (ref 33.6–35)
MCV RBC AUTO: 86.1 FL (ref 81.4–97.8)
PLATELET # BLD AUTO: 281 K/UL (ref 164–446)
PMV BLD AUTO: 8.6 FL (ref 9–12.9)
POTASSIUM SERPL-SCNC: 3.6 MMOL/L (ref 3.6–5.5)
RBC # BLD AUTO: 5.05 M/UL (ref 4.2–5.4)
SODIUM SERPL-SCNC: 138 MMOL/L (ref 135–145)
WBC # BLD AUTO: 7.8 K/UL (ref 4.8–10.8)

## 2022-04-06 PROCEDURE — 93010 ELECTROCARDIOGRAM REPORT: CPT | Performed by: INTERNAL MEDICINE

## 2022-04-06 PROCEDURE — 87641 MR-STAPH DNA AMP PROBE: CPT

## 2022-04-06 PROCEDURE — 36415 COLL VENOUS BLD VENIPUNCTURE: CPT

## 2022-04-06 PROCEDURE — 80048 BASIC METABOLIC PNL TOTAL CA: CPT

## 2022-04-06 PROCEDURE — 93005 ELECTROCARDIOGRAM TRACING: CPT

## 2022-04-06 PROCEDURE — 85027 COMPLETE CBC AUTOMATED: CPT

## 2022-04-06 PROCEDURE — 87640 STAPH A DNA AMP PROBE: CPT | Mod: XU

## 2022-04-06 RX ORDER — ACETAMINOPHEN 160 MG
1 TABLET,DISINTEGRATING ORAL DAILY
COMMUNITY

## 2022-04-06 NOTE — DISCHARGE PLANNING
"DISCHARGE PLANNING NOTE - TOTAL JOINT    Procedure: Procedure(s):  ARTHROPLASTY, HIP, TOTAL RIGHT  Procedure Date: 4/14/2022  Insurance: Payor: MEDICARE / Plan: MEDICARE PART A & B / Product Type: *No Product type* /    Equipment currently available at home?  front-wheel walker  Steps into the home? 3  Steps within the home? 0  Toilet height? ADA,5'1\"  Type of shower? tub-shower  Who will be with you during your recovery? other: daughter-in-law  Is Outpatient Physical Therapy set up after surgery? Yes  Did you take the Total Joint Class and where? No   Planning same day discharge?No will stay one night for obs per pt.    Plan: Met with patient for preadmit appointment. NAON booklet and link to online joint class given to patient. She is stating that Tsai's office is supposed to be ordering her a better walker as the one at home is hard for her to use. Tub-transfer bench also recommended for patient. Equipment list and home safety checklist reviewed and sent with patient. Her DIL will be with her during her recovery and can provide transport as needed. She will be doing her PT in [place]. Anticipate discharge home.    " Yes

## 2022-04-06 NOTE — OR NURSING
DRAKE for Dede, Supervisor of Surgery Schedulers at [facility], that the patient will need an order for a COVID test faxed to [facility] at [phone].  The patient will go to have the COVID test performed on 4/8/22.  Requested that Dede call me at [phone] to affirm that this message was received.    Patient has been instructed to hand carry her results with her on DOS 4/14/22.  Faxed request for COVID test results to [facility] at [phone] on 4/6/22 at 2:40 pm.    Destiny called back from the [facility] to let this RN know that she had received the message and stated that she would follow-up about calling in orders to [facility].  This RN verbalized understanding.

## 2022-04-07 LAB
SCCMEC + MECA PNL NOSE NAA+PROBE: NEGATIVE
SCCMEC + MECA PNL NOSE NAA+PROBE: NEGATIVE

## 2022-04-14 ENCOUNTER — ANESTHESIA (OUTPATIENT)
Dept: SURGERY | Facility: MEDICAL CENTER | Age: 77
End: 2022-04-14
Payer: MEDICARE

## 2022-04-14 ENCOUNTER — HOSPITAL ENCOUNTER (OUTPATIENT)
Facility: MEDICAL CENTER | Age: 77
End: 2022-04-15
Attending: ORTHOPAEDIC SURGERY | Admitting: ORTHOPAEDIC SURGERY
Payer: MEDICARE

## 2022-04-14 ENCOUNTER — ANESTHESIA EVENT (OUTPATIENT)
Dept: SURGERY | Facility: MEDICAL CENTER | Age: 77
End: 2022-04-14
Payer: MEDICARE

## 2022-04-14 DIAGNOSIS — M16.11 PRIMARY LOCALIZED OSTEOARTHRITIS OF RIGHT HIP: ICD-10-CM

## 2022-04-14 LAB
ABO + RH BLD: NORMAL
ABO GROUP BLD: NORMAL
BLD GP AB SCN SERPL QL: NORMAL
RH BLD: NORMAL

## 2022-04-14 PROCEDURE — 96375 TX/PRO/DX INJ NEW DRUG ADDON: CPT

## 2022-04-14 PROCEDURE — 700101 HCHG RX REV CODE 250: Performed by: ANESTHESIOLOGY

## 2022-04-14 PROCEDURE — 96374 THER/PROPH/DIAG INJ IV PUSH: CPT | Mod: XU

## 2022-04-14 PROCEDURE — 501838 HCHG SUTURE GENERAL: Performed by: ORTHOPAEDIC SURGERY

## 2022-04-14 PROCEDURE — 8968 PR NO CHARGE - PROCEDURE: Performed by: SPECIALIST/TECHNOLOGIST, OTHER

## 2022-04-14 PROCEDURE — 36415 COLL VENOUS BLD VENIPUNCTURE: CPT

## 2022-04-14 PROCEDURE — 01214 ANES OPEN PX TOT HIP ARTHRP: CPT | Performed by: ANESTHESIOLOGY

## 2022-04-14 PROCEDURE — 160031 HCHG SURGERY MINUTES - 1ST 30 MINS LEVEL 5: Performed by: ORTHOPAEDIC SURGERY

## 2022-04-14 PROCEDURE — 160002 HCHG RECOVERY MINUTES (STAT): Performed by: ORTHOPAEDIC SURGERY

## 2022-04-14 PROCEDURE — 700111 HCHG RX REV CODE 636 W/ 250 OVERRIDE (IP): Performed by: ANESTHESIOLOGY

## 2022-04-14 PROCEDURE — 99100 ANES PT EXTEME AGE<1 YR&>70: CPT | Performed by: ANESTHESIOLOGY

## 2022-04-14 PROCEDURE — 700105 HCHG RX REV CODE 258: Performed by: ORTHOPAEDIC SURGERY

## 2022-04-14 PROCEDURE — 86850 RBC ANTIBODY SCREEN: CPT

## 2022-04-14 PROCEDURE — A6454 SELF-ADHER BAND W>=3" <5"/YD: HCPCS | Performed by: ORTHOPAEDIC SURGERY

## 2022-04-14 PROCEDURE — A9270 NON-COVERED ITEM OR SERVICE: HCPCS | Performed by: ANESTHESIOLOGY

## 2022-04-14 PROCEDURE — 700111 HCHG RX REV CODE 636 W/ 250 OVERRIDE (IP): Performed by: ORTHOPAEDIC SURGERY

## 2022-04-14 PROCEDURE — C1713 ANCHOR/SCREW BN/BN,TIS/BN: HCPCS | Performed by: ORTHOPAEDIC SURGERY

## 2022-04-14 PROCEDURE — 160035 HCHG PACU - 1ST 60 MINS PHASE I: Performed by: ORTHOPAEDIC SURGERY

## 2022-04-14 PROCEDURE — 700102 HCHG RX REV CODE 250 W/ 637 OVERRIDE(OP): Performed by: ANESTHESIOLOGY

## 2022-04-14 PROCEDURE — 86901 BLOOD TYPING SEROLOGIC RH(D): CPT

## 2022-04-14 PROCEDURE — 502000 HCHG MISC OR IMPLANTS RC 0278: Performed by: ORTHOPAEDIC SURGERY

## 2022-04-14 PROCEDURE — C1776 JOINT DEVICE (IMPLANTABLE): HCPCS | Performed by: ORTHOPAEDIC SURGERY

## 2022-04-14 PROCEDURE — A9270 NON-COVERED ITEM OR SERVICE: HCPCS | Performed by: ORTHOPAEDIC SURGERY

## 2022-04-14 PROCEDURE — 96376 TX/PRO/DX INJ SAME DRUG ADON: CPT

## 2022-04-14 PROCEDURE — 500367 HCHG DRAIN KIT, HEMOVAC: Performed by: ORTHOPAEDIC SURGERY

## 2022-04-14 PROCEDURE — 700101 HCHG RX REV CODE 250: Performed by: ORTHOPAEDIC SURGERY

## 2022-04-14 PROCEDURE — 160009 HCHG ANES TIME/MIN: Performed by: ORTHOPAEDIC SURGERY

## 2022-04-14 PROCEDURE — 160042 HCHG SURGERY MINUTES - EA ADDL 1 MIN LEVEL 5: Performed by: ORTHOPAEDIC SURGERY

## 2022-04-14 PROCEDURE — 160048 HCHG OR STATISTICAL LEVEL 1-5: Performed by: ORTHOPAEDIC SURGERY

## 2022-04-14 PROCEDURE — G0378 HOSPITAL OBSERVATION PER HR: HCPCS

## 2022-04-14 PROCEDURE — 160036 HCHG PACU - EA ADDL 30 MINS PHASE I: Performed by: ORTHOPAEDIC SURGERY

## 2022-04-14 PROCEDURE — 700102 HCHG RX REV CODE 250 W/ 637 OVERRIDE(OP): Performed by: ORTHOPAEDIC SURGERY

## 2022-04-14 PROCEDURE — 700105 HCHG RX REV CODE 258: Performed by: ANESTHESIOLOGY

## 2022-04-14 PROCEDURE — 86900 BLOOD TYPING SEROLOGIC ABO: CPT

## 2022-04-14 PROCEDURE — 27130 TOTAL HIP ARTHROPLASTY: CPT | Mod: RT | Performed by: ORTHOPAEDIC SURGERY

## 2022-04-14 DEVICE — IMPLANTABLE DEVICE: Type: IMPLANTABLE DEVICE | Site: HIP | Status: FUNCTIONAL

## 2022-04-14 RX ORDER — OXYCODONE HYDROCHLORIDE 10 MG/1
10 TABLET ORAL
Status: DISCONTINUED | OUTPATIENT
Start: 2022-04-14 | End: 2022-04-15 | Stop reason: HOSPADM

## 2022-04-14 RX ORDER — HYDROMORPHONE HYDROCHLORIDE 1 MG/ML
0.1 INJECTION, SOLUTION INTRAMUSCULAR; INTRAVENOUS; SUBCUTANEOUS
Status: DISCONTINUED | OUTPATIENT
Start: 2022-04-14 | End: 2022-04-14 | Stop reason: HOSPADM

## 2022-04-14 RX ORDER — OXYCODONE HCL 5 MG/5 ML
10 SOLUTION, ORAL ORAL
Status: COMPLETED | OUTPATIENT
Start: 2022-04-14 | End: 2022-04-14

## 2022-04-14 RX ORDER — KETOROLAC TROMETHAMINE 30 MG/ML
INJECTION, SOLUTION INTRAMUSCULAR; INTRAVENOUS
Status: DISCONTINUED | OUTPATIENT
Start: 2022-04-14 | End: 2022-04-14 | Stop reason: HOSPADM

## 2022-04-14 RX ORDER — CELECOXIB 200 MG/1
200 CAPSULE ORAL ONCE
Status: DISCONTINUED | OUTPATIENT
Start: 2022-04-14 | End: 2022-04-14 | Stop reason: HOSPADM

## 2022-04-14 RX ORDER — BUPIVACAINE HYDROCHLORIDE 2.5 MG/ML
INJECTION, SOLUTION EPIDURAL; INFILTRATION; INTRACAUDAL
Status: DISCONTINUED | OUTPATIENT
Start: 2022-04-14 | End: 2022-04-14 | Stop reason: HOSPADM

## 2022-04-14 RX ORDER — DOCUSATE SODIUM 100 MG/1
100 CAPSULE, LIQUID FILLED ORAL 2 TIMES DAILY
Status: DISCONTINUED | OUTPATIENT
Start: 2022-04-14 | End: 2022-04-15 | Stop reason: HOSPADM

## 2022-04-14 RX ORDER — MORPHINE SULFATE 4 MG/ML
4 INJECTION INTRAVENOUS
Status: DISCONTINUED | OUTPATIENT
Start: 2022-04-14 | End: 2022-04-15 | Stop reason: HOSPADM

## 2022-04-14 RX ORDER — SODIUM CHLORIDE, SODIUM LACTATE, POTASSIUM CHLORIDE, CALCIUM CHLORIDE 600; 310; 30; 20 MG/100ML; MG/100ML; MG/100ML; MG/100ML
INJECTION, SOLUTION INTRAVENOUS CONTINUOUS
Status: DISCONTINUED | OUTPATIENT
Start: 2022-04-14 | End: 2022-04-14 | Stop reason: HOSPADM

## 2022-04-14 RX ORDER — AMOXICILLIN 250 MG
1 CAPSULE ORAL NIGHTLY
Status: DISCONTINUED | OUTPATIENT
Start: 2022-04-14 | End: 2022-04-15 | Stop reason: HOSPADM

## 2022-04-14 RX ORDER — POLYETHYLENE GLYCOL 3350 17 G/17G
1 POWDER, FOR SOLUTION ORAL 2 TIMES DAILY PRN
Status: DISCONTINUED | OUTPATIENT
Start: 2022-04-14 | End: 2022-04-15 | Stop reason: HOSPADM

## 2022-04-14 RX ORDER — HYDROMORPHONE HYDROCHLORIDE 1 MG/ML
0.4 INJECTION, SOLUTION INTRAMUSCULAR; INTRAVENOUS; SUBCUTANEOUS
Status: DISCONTINUED | OUTPATIENT
Start: 2022-04-14 | End: 2022-04-14 | Stop reason: HOSPADM

## 2022-04-14 RX ORDER — MEPERIDINE HYDROCHLORIDE 25 MG/ML
6.25 INJECTION INTRAMUSCULAR; INTRAVENOUS; SUBCUTANEOUS
Status: DISCONTINUED | OUTPATIENT
Start: 2022-04-14 | End: 2022-04-14 | Stop reason: HOSPADM

## 2022-04-14 RX ORDER — HYDROMORPHONE HYDROCHLORIDE 1 MG/ML
0.2 INJECTION, SOLUTION INTRAMUSCULAR; INTRAVENOUS; SUBCUTANEOUS
Status: DISCONTINUED | OUTPATIENT
Start: 2022-04-14 | End: 2022-04-14 | Stop reason: HOSPADM

## 2022-04-14 RX ORDER — MIDAZOLAM HYDROCHLORIDE 1 MG/ML
INJECTION INTRAMUSCULAR; INTRAVENOUS PRN
Status: DISCONTINUED | OUTPATIENT
Start: 2022-04-14 | End: 2022-04-14 | Stop reason: SURG

## 2022-04-14 RX ORDER — DIPHENHYDRAMINE HYDROCHLORIDE 50 MG/ML
25 INJECTION INTRAMUSCULAR; INTRAVENOUS EVERY 6 HOURS PRN
Status: DISCONTINUED | OUTPATIENT
Start: 2022-04-14 | End: 2022-04-15 | Stop reason: HOSPADM

## 2022-04-14 RX ORDER — ONDANSETRON 2 MG/ML
INJECTION INTRAMUSCULAR; INTRAVENOUS PRN
Status: DISCONTINUED | OUTPATIENT
Start: 2022-04-14 | End: 2022-04-14 | Stop reason: SURG

## 2022-04-14 RX ORDER — DEXAMETHASONE SODIUM PHOSPHATE 4 MG/ML
INJECTION, SOLUTION INTRA-ARTICULAR; INTRALESIONAL; INTRAMUSCULAR; INTRAVENOUS; SOFT TISSUE PRN
Status: DISCONTINUED | OUTPATIENT
Start: 2022-04-14 | End: 2022-04-14 | Stop reason: SURG

## 2022-04-14 RX ORDER — LIDOCAINE HYDROCHLORIDE 20 MG/ML
INJECTION, SOLUTION EPIDURAL; INFILTRATION; INTRACAUDAL; PERINEURAL PRN
Status: DISCONTINUED | OUTPATIENT
Start: 2022-04-14 | End: 2022-04-14 | Stop reason: SURG

## 2022-04-14 RX ORDER — ONDANSETRON 2 MG/ML
4 INJECTION INTRAMUSCULAR; INTRAVENOUS
Status: DISCONTINUED | OUTPATIENT
Start: 2022-04-14 | End: 2022-04-14 | Stop reason: HOSPADM

## 2022-04-14 RX ORDER — SCOLOPAMINE TRANSDERMAL SYSTEM 1 MG/1
1 PATCH, EXTENDED RELEASE TRANSDERMAL
Status: DISCONTINUED | OUTPATIENT
Start: 2022-04-14 | End: 2022-04-15 | Stop reason: HOSPADM

## 2022-04-14 RX ORDER — TRANEXAMIC ACID 100 MG/ML
INJECTION, SOLUTION INTRAVENOUS PRN
Status: DISCONTINUED | OUTPATIENT
Start: 2022-04-14 | End: 2022-04-14 | Stop reason: SURG

## 2022-04-14 RX ORDER — SODIUM CHLORIDE, SODIUM LACTATE, POTASSIUM CHLORIDE, CALCIUM CHLORIDE 600; 310; 30; 20 MG/100ML; MG/100ML; MG/100ML; MG/100ML
INJECTION, SOLUTION INTRAVENOUS CONTINUOUS
Status: ACTIVE | OUTPATIENT
Start: 2022-04-14 | End: 2022-04-14

## 2022-04-14 RX ORDER — VANCOMYCIN HYDROCHLORIDE 500 MG/10ML
INJECTION, POWDER, LYOPHILIZED, FOR SOLUTION INTRAVENOUS
Status: COMPLETED | OUTPATIENT
Start: 2022-04-14 | End: 2022-04-14

## 2022-04-14 RX ORDER — MAGNESIUM HYDROXIDE 1200 MG/15ML
LIQUID ORAL
Status: COMPLETED | OUTPATIENT
Start: 2022-04-14 | End: 2022-04-14

## 2022-04-14 RX ORDER — ACETAMINOPHEN 500 MG
1000 TABLET ORAL EVERY 6 HOURS PRN
Status: DISCONTINUED | OUTPATIENT
Start: 2022-04-19 | End: 2022-04-15 | Stop reason: HOSPADM

## 2022-04-14 RX ORDER — ACETAMINOPHEN 500 MG
1000 TABLET ORAL EVERY 6 HOURS
Status: DISCONTINUED | OUTPATIENT
Start: 2022-04-15 | End: 2022-04-15 | Stop reason: HOSPADM

## 2022-04-14 RX ORDER — KETOROLAC TROMETHAMINE 30 MG/ML
15 INJECTION, SOLUTION INTRAMUSCULAR; INTRAVENOUS EVERY 6 HOURS
Status: DISCONTINUED | OUTPATIENT
Start: 2022-04-14 | End: 2022-04-15 | Stop reason: HOSPADM

## 2022-04-14 RX ORDER — AMOXICILLIN 250 MG
1 CAPSULE ORAL
Status: DISCONTINUED | OUTPATIENT
Start: 2022-04-14 | End: 2022-04-15 | Stop reason: HOSPADM

## 2022-04-14 RX ORDER — OXYCODONE HCL 5 MG/5 ML
5 SOLUTION, ORAL ORAL
Status: COMPLETED | OUTPATIENT
Start: 2022-04-14 | End: 2022-04-14

## 2022-04-14 RX ORDER — MORPHINE SULFATE 0.5 MG/ML
INJECTION, SOLUTION EPIDURAL; INTRATHECAL; INTRAVENOUS
Status: DISCONTINUED | OUTPATIENT
Start: 2022-04-14 | End: 2022-04-14 | Stop reason: HOSPADM

## 2022-04-14 RX ORDER — CEFAZOLIN SODIUM 1 G/3ML
2 INJECTION, POWDER, FOR SOLUTION INTRAMUSCULAR; INTRAVENOUS ONCE
Status: DISCONTINUED | OUTPATIENT
Start: 2022-04-14 | End: 2022-04-14 | Stop reason: HOSPADM

## 2022-04-14 RX ORDER — ACETAMINOPHEN 10 MG/ML
1000 INJECTION, SOLUTION INTRAVENOUS EVERY 6 HOURS
Status: COMPLETED | OUTPATIENT
Start: 2022-04-14 | End: 2022-04-15

## 2022-04-14 RX ORDER — LORAZEPAM 2 MG/ML
0.5 INJECTION INTRAMUSCULAR
Status: DISCONTINUED | OUTPATIENT
Start: 2022-04-14 | End: 2022-04-14 | Stop reason: HOSPADM

## 2022-04-14 RX ORDER — HALOPERIDOL 5 MG/ML
1 INJECTION INTRAMUSCULAR
Status: DISCONTINUED | OUTPATIENT
Start: 2022-04-14 | End: 2022-04-14 | Stop reason: HOSPADM

## 2022-04-14 RX ORDER — ROCURONIUM BROMIDE 10 MG/ML
INJECTION, SOLUTION INTRAVENOUS PRN
Status: DISCONTINUED | OUTPATIENT
Start: 2022-04-14 | End: 2022-04-14 | Stop reason: SURG

## 2022-04-14 RX ORDER — DEXAMETHASONE SODIUM PHOSPHATE 4 MG/ML
4 INJECTION, SOLUTION INTRA-ARTICULAR; INTRALESIONAL; INTRAMUSCULAR; INTRAVENOUS; SOFT TISSUE
Status: DISCONTINUED | OUTPATIENT
Start: 2022-04-14 | End: 2022-04-15 | Stop reason: HOSPADM

## 2022-04-14 RX ORDER — IBUPROFEN 800 MG/1
800 TABLET ORAL 3 TIMES DAILY PRN
Status: DISCONTINUED | OUTPATIENT
Start: 2022-04-17 | End: 2022-04-15 | Stop reason: HOSPADM

## 2022-04-14 RX ORDER — ACETAMINOPHEN 500 MG
1000 TABLET ORAL ONCE
Status: COMPLETED | OUTPATIENT
Start: 2022-04-14 | End: 2022-04-14

## 2022-04-14 RX ORDER — CEFAZOLIN SODIUM 1 G/3ML
INJECTION, POWDER, FOR SOLUTION INTRAMUSCULAR; INTRAVENOUS PRN
Status: DISCONTINUED | OUTPATIENT
Start: 2022-04-14 | End: 2022-04-14 | Stop reason: SURG

## 2022-04-14 RX ORDER — DIPHENHYDRAMINE HYDROCHLORIDE 50 MG/ML
12.5 INJECTION INTRAMUSCULAR; INTRAVENOUS
Status: DISCONTINUED | OUTPATIENT
Start: 2022-04-14 | End: 2022-04-14 | Stop reason: HOSPADM

## 2022-04-14 RX ORDER — ONDANSETRON 2 MG/ML
4 INJECTION INTRAMUSCULAR; INTRAVENOUS EVERY 4 HOURS PRN
Status: DISCONTINUED | OUTPATIENT
Start: 2022-04-14 | End: 2022-04-15 | Stop reason: HOSPADM

## 2022-04-14 RX ORDER — OXYCODONE HYDROCHLORIDE 5 MG/1
5 TABLET ORAL
Status: DISCONTINUED | OUTPATIENT
Start: 2022-04-14 | End: 2022-04-15 | Stop reason: HOSPADM

## 2022-04-14 RX ORDER — AZELASTINE HYDROCHLORIDE 0.5 MG/ML
1 SOLUTION/ DROPS OPHTHALMIC 2 TIMES DAILY
COMMUNITY
Start: 2022-03-17

## 2022-04-14 RX ORDER — HALOPERIDOL 5 MG/ML
1 INJECTION INTRAMUSCULAR EVERY 6 HOURS PRN
Status: DISCONTINUED | OUTPATIENT
Start: 2022-04-14 | End: 2022-04-15 | Stop reason: HOSPADM

## 2022-04-14 RX ORDER — ENEMA 19; 7 G/133ML; G/133ML
1 ENEMA RECTAL
Status: DISCONTINUED | OUTPATIENT
Start: 2022-04-14 | End: 2022-04-15 | Stop reason: HOSPADM

## 2022-04-14 RX ORDER — BISACODYL 10 MG
10 SUPPOSITORY, RECTAL RECTAL
Status: DISCONTINUED | OUTPATIENT
Start: 2022-04-14 | End: 2022-04-15 | Stop reason: HOSPADM

## 2022-04-14 RX ORDER — SODIUM CHLORIDE 9 MG/ML
INJECTION, SOLUTION INTRAMUSCULAR; INTRAVENOUS; SUBCUTANEOUS
Status: DISCONTINUED | OUTPATIENT
Start: 2022-04-14 | End: 2022-04-14 | Stop reason: HOSPADM

## 2022-04-14 RX ADMIN — CEFAZOLIN 2 G: 330 INJECTION, POWDER, FOR SOLUTION INTRAMUSCULAR; INTRAVENOUS at 10:04

## 2022-04-14 RX ADMIN — LIDOCAINE HYDROCHLORIDE 100 MG: 20 INJECTION, SOLUTION EPIDURAL; INFILTRATION; INTRACAUDAL at 09:58

## 2022-04-14 RX ADMIN — PROPOFOL 150 MG: 10 INJECTION, EMULSION INTRAVENOUS at 09:58

## 2022-04-14 RX ADMIN — ACETAMINOPHEN 1000 MG: 500 TABLET ORAL at 09:31

## 2022-04-14 RX ADMIN — FENTANYL CITRATE 50 MCG: 50 INJECTION, SOLUTION INTRAMUSCULAR; INTRAVENOUS at 11:03

## 2022-04-14 RX ADMIN — OXYCODONE HYDROCHLORIDE 10 MG: 5 SOLUTION ORAL at 11:37

## 2022-04-14 RX ADMIN — ROCURONIUM BROMIDE 50 MG: 10 INJECTION, SOLUTION INTRAVENOUS at 09:58

## 2022-04-14 RX ADMIN — FENTANYL CITRATE 50 MCG: 50 INJECTION, SOLUTION INTRAMUSCULAR; INTRAVENOUS at 11:36

## 2022-04-14 RX ADMIN — DOCUSATE SODIUM 100 MG: 100 CAPSULE, LIQUID FILLED ORAL at 17:38

## 2022-04-14 RX ADMIN — ONDANSETRON 4 MG: 2 INJECTION INTRAMUSCULAR; INTRAVENOUS at 11:02

## 2022-04-14 RX ADMIN — HYDROMORPHONE HYDROCHLORIDE 0.4 MG: 1 INJECTION, SOLUTION INTRAMUSCULAR; INTRAVENOUS; SUBCUTANEOUS at 12:30

## 2022-04-14 RX ADMIN — SUGAMMADEX 200 MG: 100 INJECTION, SOLUTION INTRAVENOUS at 11:03

## 2022-04-14 RX ADMIN — SODIUM CHLORIDE, POTASSIUM CHLORIDE, SODIUM LACTATE AND CALCIUM CHLORIDE: 600; 310; 30; 20 INJECTION, SOLUTION INTRAVENOUS at 11:22

## 2022-04-14 RX ADMIN — FENTANYL CITRATE 50 MCG: 50 INJECTION, SOLUTION INTRAMUSCULAR; INTRAVENOUS at 11:33

## 2022-04-14 RX ADMIN — SODIUM CHLORIDE, POTASSIUM CHLORIDE, SODIUM LACTATE AND CALCIUM CHLORIDE: 600; 310; 30; 20 INJECTION, SOLUTION INTRAVENOUS at 09:31

## 2022-04-14 RX ADMIN — ACETAMINOPHEN 1000 MG: 10 INJECTION, SOLUTION INTRAVENOUS at 21:05

## 2022-04-14 RX ADMIN — MIDAZOLAM HYDROCHLORIDE 1 MG: 1 INJECTION, SOLUTION INTRAMUSCULAR; INTRAVENOUS at 09:54

## 2022-04-14 RX ADMIN — FENTANYL CITRATE 50 MCG: 50 INJECTION, SOLUTION INTRAMUSCULAR; INTRAVENOUS at 11:48

## 2022-04-14 RX ADMIN — TRANEXAMIC ACID 1000 MG: 100 INJECTION, SOLUTION INTRAVENOUS at 10:05

## 2022-04-14 RX ADMIN — ONDANSETRON 4 MG: 2 INJECTION INTRAMUSCULAR; INTRAVENOUS at 21:07

## 2022-04-14 RX ADMIN — KETOROLAC TROMETHAMINE 15 MG: 30 INJECTION, SOLUTION INTRAMUSCULAR at 17:38

## 2022-04-14 RX ADMIN — ONDANSETRON 4 MG: 2 INJECTION INTRAMUSCULAR; INTRAVENOUS at 14:39

## 2022-04-14 RX ADMIN — FENTANYL CITRATE 50 MCG: 50 INJECTION, SOLUTION INTRAMUSCULAR; INTRAVENOUS at 11:21

## 2022-04-14 RX ADMIN — HYDROMORPHONE HYDROCHLORIDE 0.4 MG: 1 INJECTION, SOLUTION INTRAMUSCULAR; INTRAVENOUS; SUBCUTANEOUS at 12:00

## 2022-04-14 RX ADMIN — FENTANYL CITRATE 50 MCG: 50 INJECTION, SOLUTION INTRAMUSCULAR; INTRAVENOUS at 09:58

## 2022-04-14 RX ADMIN — DEXAMETHASONE SODIUM PHOSPHATE 4 MG: 4 INJECTION, SOLUTION INTRA-ARTICULAR; INTRALESIONAL; INTRAMUSCULAR; INTRAVENOUS; SOFT TISSUE at 10:06

## 2022-04-14 RX ADMIN — FENTANYL CITRATE 100 MCG: 50 INJECTION, SOLUTION INTRAMUSCULAR; INTRAVENOUS at 10:25

## 2022-04-14 RX ADMIN — OXYCODONE 5 MG: 5 TABLET ORAL at 17:46

## 2022-04-14 RX ADMIN — HYDROMORPHONE HYDROCHLORIDE 0.4 MG: 1 INJECTION, SOLUTION INTRAMUSCULAR; INTRAVENOUS; SUBCUTANEOUS at 12:20

## 2022-04-14 RX ADMIN — KETOROLAC TROMETHAMINE 15 MG: 30 INJECTION, SOLUTION INTRAMUSCULAR at 23:32

## 2022-04-14 RX ADMIN — HYDROMORPHONE HYDROCHLORIDE 0.4 MG: 1 INJECTION, SOLUTION INTRAMUSCULAR; INTRAVENOUS; SUBCUTANEOUS at 12:09

## 2022-04-14 RX ADMIN — OXYCODONE HYDROCHLORIDE 10 MG: 10 TABLET ORAL at 21:05

## 2022-04-14 ASSESSMENT — PAIN DESCRIPTION - PAIN TYPE
TYPE: SURGICAL PAIN
TYPE: ACUTE PAIN;SURGICAL PAIN
TYPE: ACUTE PAIN;SURGICAL PAIN
TYPE: CHRONIC PAIN
TYPE: SURGICAL PAIN

## 2022-04-14 ASSESSMENT — PAIN SCALES - GENERAL: PAIN_LEVEL: 4

## 2022-04-14 NOTE — ANESTHESIA PROCEDURE NOTES
Airway    Date/Time: 4/14/2022 9:59 AM  Performed by: Monique Ramirez M.D.  Authorized by: Monique Ramirez M.D.     Location:  OR  Urgency:  Elective  Difficult Airway: No    Indications for Airway Management:  Anesthesia      Spontaneous Ventilation: absent    Sedation Level:  Deep  Preoxygenated: Yes    Patient Position:  Sniffing  Mask Difficulty Assessment:  1 - vent by mask  Final Airway Type:  Endotracheal airway  Final Endotracheal Airway:  ETT  Cuffed: Yes    Technique Used for Successful ETT Placement:  Direct laryngoscopy    Insertion Site:  Oral  Blade Type:  Jamal  Laryngoscope Blade/Videolaryngoscope Blade Size:  3  ETT Size (mm):  7.0  Measured from:  Teeth  ETT to Teeth (cm):  21  Placement Verified by: auscultation and capnometry    Cormack-Lehane Classification:  Grade I - full view of glottis  Number of Attempts at Approach:  1  Number of Other Approaches Attempted:  0

## 2022-04-14 NOTE — OP REPORT
DATE OF SERVICE:  04/14/2022     PREOPERATIVE DIAGNOSIS:  Degenerative arthritis of the hip.     POSTOPERATIVE DIAGNOSIS:  Degenerative arthritis of the hip.     OPERATION PERFORMED:  Right total hip arthroplasty.     APPROACH:  Anterolateral.     COMPONENTS UTILIZED:  Clemencia.     COMPLICATIONS:  None.     BLOOD LOSS:  50 mL.     MEDICATIONS UTILIZED:  Ancef and vancomycin powder, TXA.     DESCRIPTION OF PROCEDURE:  The patient was brought to the operating room   awake, alert, placed on the operating room table in supine position, delivered   general endotracheal anesthetic.  The patient was then rolled into the   lateral position.  Routine for total hip arthroplasty.  All bony prominences   and nerves were well padded and protected.  Timeout, spacesuits were utilized.   To decrease any unnecessary room traffic, sign was placed on the outside of   the door.     A straight incision over the trochanter, subcutaneous tissue dissected down to   the tensor, which was opened and then an anterolateral approach to the hip   was utilized.  The inferior third gluteus medius and minimus was dissected off   the capsule.  Steinmann pins were placed on the wing of the ilium for a   self-retaining retractor.  The capsule was then opened in an H fashion along   the acetabular brim and at the base of the trochanter and then a vertical   incision along the course of the neck.  The capsule was now tagged with #1   Vicryl and the hip was dislocated.     Now, the femoral neck cut made approximately 1.5 cm above the palpable lesser   trochanter.  Retractors were now placed, giving easy access to the acetabulum.     Circumferential excision of the labrum. The cotyloid fossa was cleared of its   pulvinar and then in 20 degrees forward flexion, 45 degrees of abduction, the   cup was reamed to a 52 and a 52, 3-hole press-fit cup was tapped into   position.  Two screws stabilized the cup and then a neutral offset highly   cross-linked  polyethylene liner was tapped into position, checked for   stability.     Our attention was now turned to the femoral side, placed in the leg bag.    Retractors were placed, giving easy access to the femoral neck. Using a box   cutter to open the lateral cortex, then the T-handled awl to gain access to   the intramedullary canal and then progressively broaching to a #5 and 10   degrees of anteversion.  We now trialled with a standard offset, 32 trial   head, equal leg lengths, good stability.  These components were selected.  The   hip was now redislocated.  The trial components were simply exchanged for the   permanent components. The Matthews taper on the stem was irrigated, dried and   then the Oxinium 32 mm head was tapped into position.  The cup was checked for   any debris.  The hip was reduced and basically the operation was completed.     We now closed the capsule with #1 Vicryl.  We placed vancomycin powder in the   joint and then brought the abductor back into its anatomic position with   locking #5 Ti-Cron and then we reinforced the suture line with #1 Vicryl,   injected the capsule with our essence and then placed the remainder of the   vancomycin powder while we closed the tensor fascia with #1, 2-0 subcutaneous,   skin staples, sterile compression dressing and the patient was then taken to   recovery room in stable condition.  No intraoperative or immediate   postoperative complications.  The patient tolerated the procedure well.        ______________________________  MD LAWRENCE Wick/BIN/SHAHZAD    DD:  04/14/2022 11:16  DT:  04/14/2022 11:45    Job#:  [id number]

## 2022-04-14 NOTE — OR SURGEON
Immediate Post OP Note    PreOp Diagnosis: right hip arthritis      PostOp Diagnosis: same      Procedure(s):  ARTHROPLASTY, HIP, TOTAL RIGHT - Wound Class: Clean    Surgeon(s):  Dong Tsai M.D.    Anesthesiologist/Type of Anesthesia:  Anesthesiologist: Monique Ramirze M.D./General    Surgical Staff:  Circulator: Natasha Cody RHARISH; Jocelyne Mae R.N.  Limb Monson: Patricia Mccabe  Scrub Person: Macario Schwartz  First Assist: Sherrie Mota    Specimens removed if any:  * No specimens in log *    Estimated Blood Loss: 50ccs    Findings: as described    Complications: none        4/14/2022 11:11 AM Dong Tsai M.D.

## 2022-04-14 NOTE — ANESTHESIA PREPROCEDURE EVALUATION
Case: [id number] Date/Time: 04/14/22 0945    Procedure: ARTHROPLASTY, HIP, TOTAL RIGHT (Right )    Diagnosis: Primary localized osteoarthritis of right hip [M16.11]    Pre-op diagnosis: Primary localized osteoarthritis of right hip [M16.11]    Location: [place] [address] / SURGERY [facility]    Surgeons: Dong Tsai M.D.          Relevant Problems   CARDIAC   (positive) Essential hypertension, benign       Physical Exam    Airway   Mallampati: II  TM distance: >3 FB  Neck ROM: full       Cardiovascular - normal exam  Rhythm: regular  Rate: normal  (-) murmur     Dental - normal exam           Pulmonary - normal exam  Breath sounds clear to auscultation     Abdominal    Neurological - normal exam                 Anesthesia Plan    ASA 2       Plan - general       Airway plan will be ETT          Induction: intravenous    Postoperative Plan: Postoperative administration of opioids is intended.    Pertinent diagnostic labs and testing reviewed    Informed Consent:    Anesthetic plan and risks discussed with patient.    Use of blood products discussed with: patient whom consented to blood products.

## 2022-04-14 NOTE — ANESTHESIA TIME REPORT
Anesthesia Start and Stop Event Times     Date Time Event    4/14/2022 0936 Ready for Procedure     0954 Anesthesia Start     1113 Anesthesia Stop        Responsible Staff  04/14/22    Name Role Begin End    Monique Ramirez M.D. Anesth 0954 1113        Overtime Reason:  no overtime (within assigned shift)    Comments:

## 2022-04-14 NOTE — ANESTHESIA POSTPROCEDURE EVALUATION
Patient: Yas Ortiz    Procedure Summary     Date: 04/14/22 Room / Location: [place] [address] / SURGERY [facility]    Anesthesia Start: 0954 Anesthesia Stop: 1113    Procedure: ARTHROPLASTY, HIP, TOTAL RIGHT (Right Hip) Diagnosis:       Primary localized osteoarthritis of right hip      (Primary localized osteoarthritis of right hip [M16.11])    Surgeons: Dong Tsai M.D. Responsible Provider: Monique Ramirez M.D.    Anesthesia Type: general ASA Status: 2          Final Anesthesia Type: general  Last vitals  BP   Blood Pressure : 149/73    Temp   36.8 °C (98.2 °F)    Pulse   72   Resp   15    SpO2   97 %      Anesthesia Post Evaluation    Patient location during evaluation: PACU  Patient participation: complete - patient participated  Level of consciousness: awake and alert  Pain score: 4    Airway patency: patent  Anesthetic complications: no  Cardiovascular status: hemodynamically stable  Respiratory status: acceptable  Hydration status: euvolemic    PONV: none          No complications documented.     Nurse Pain Score: 7 (NPRS)

## 2022-04-14 NOTE — OR NURSING
The pt is awake and oriented. Respirations are regular and easy. Pain is controlled, the pt is comfortable. Dressing scant red drainage on mepilex, otherwise intact.  Family updated by phone.

## 2022-04-14 NOTE — H&P
Surgery Orthopedic History & Physical Note    Date  4/14/2022    Primary Care Physician  Susan Ruiz M.D.    CC  Pre-Op Diagnosis Codes:     * Primary localized osteoarthritis of right hip [M16.11]    HPI  This is a 76 y.o. female who presented with hip pain hip arthritis for thatoday patient seen and examined answered all questions    Past Medical History:   Diagnosis Date   • Arthritis    • Backpain     l shoulder surg cancelled for breast ca   • Cancer (HCC) 2017    Breast; Right Mastectomy; No further treatments   • CATARACT     bilat IOL   • DDD (degenerative disc disease), cervical    • High cholesterol    • Hypertension     was told by md to stop amlodipine about a year ago   • Lactose intolerance    • Pain     shoulder pain, left arm   • Urinary bladder disorder     hx of uti       Past Surgical History:   Procedure Laterality Date   • LAMINOTOMY  5/4/2021    Procedure: MINIMALLY INVASIVE REDO RIGHT LUMBAR 3 - 4 and 4 - 5 LAMINOTOMIES AND RIGHT LUMBAR 3 - 4 MICRODISCECTOMY;  Surgeon: Gerardo Johnson M.D.;  Location: [facility];  Service: Neurosurgery   • LUMBAR LAMINECTOMY DISKECTOMY  5/9/2017    Procedure: LUMBAR LAMINECTOMY DISKECTOMY FOR MINIMALLY INVASIVE INDU L3-5 LAMINOTOMIES VIA RT SIDED UNI-PORTAL;  Surgeon: Gerardo Johnson M.D.;  Location: [facility];  Service:    • SHOULDER SURGERY  6/30/15    At the same time had carpal tunnel surgery, right hand   • MASTECTOMY  9/24/2013    Performed by Francisco Yang M.D. at [facility]   • NODE BIOPSY SENTINEL  9/24/2013    Performed by Francisco Yang M.D. at [facility]   • BREAST RECONSTRUCTION  9/24/2013    Performed by Dong Del Valle M.D. at [facility]   • TISSUE EXPANDER PLACE/REMOVE  9/24/2013    Performed by Dong Del Valle M.D. at [facility]   • ABDOMINAL SUBTOTAL HYSTERECTOMY     • BLADDER SUSPENSION     • CARPAL TUNNEL RELEASE      bilat   • HYSTERECTOMY  LAPAROSCOPY     • OTHER ORTHOPEDIC SURGERY      L shoulder rotator cuff   • CA BREAST AUGMENTATION WITH IMPLANT         Current Facility-Administered Medications   Medication Dose Route Frequency Provider Last Rate Last Admin   • celecoxib (CELEBREX) capsule 200 mg  200 mg Oral Once Monique Ramirez M.D.       • acetaminophen (TYLENOL) tablet 1,000 mg  1,000 mg Oral Once Monique Ramirez M.D.       • ceFAZolin (ANCEF) injection 2 g  2 g Intravenous Once Dong Tsai M.D.       • lidocaine (XYLOCAINE) 1 % injection 0.5 mL  0.5 mL Intradermal Once PRN Dong Tsai M.D.       • lactated ringers infusion   Intravenous Continuous Dong Tsai M.D.           Social History     Socioeconomic History   • Marital status:      Spouse name: Not on file   • Number of children: Not on file   • Years of education: Not on file   • Highest education level: Not on file   Occupational History   • Not on file   Tobacco Use   • Smoking status: Light Tobacco Smoker     Packs/day: 3.00     Years: 7.00     Pack years: 21.00     Types: Cigarettes     Start date: 3/12/1972     Last attempt to quit: 1979     Years since quittin.6   • Smokeless tobacco: Never Used   • Tobacco comment: Very light smoker, smoke maybe 3 to 4 a day   Vaping Use   • Vaping Use: Never used   Substance and Sexual Activity   • Alcohol use: No   • Drug use: No   • Sexual activity: Not on file   Other Topics Concern   • Not on file   Social History Narrative   • Not on file     Social Determinants of Health     Financial Resource Strain: Not on file   Food Insecurity: Not on file   Transportation Needs: Not on file   Physical Activity: Not on file   Stress: Not on file   Social Connections: Not on file   Intimate Partner Violence: Not on file   Housing Stability: Not on file       Family History   Problem Relation Age of Onset   • Heart Failure Mother    • Hyperlipidemia Mother    • Heart Failure Father    • Heart Disease Sister         Allergies  Latex, Codeine, Lactose, Dieudonne butter, and Celery oil    Review of Systems  Negative except hip    Physical Exam   Focused exam to hip pain with rotation LLE, NV intact no other ortho findings  Vital Signs  Blood Pressure : (!) 168/87   Temperature: 36.8 °C (98.2 °F)   Pulse: 83   Respiration: 16   Pulse Oximetry: 94 %       Labs: no contraindication to surgery                    Radiology: hip arthritis  No orders to display         Assessment/Plan: FELIPE all explained, patient seen and marked  Pre-Op Diagnosis Codes:     * Primary localized osteoarthritis of right hip [M16.11]  Procedure(s):  ARTHROPLASTY, HIP, TOTAL RIGHT

## 2022-04-14 NOTE — LETTER
February 11, 2022    Patient Name: Yas Ortiz  Surgeon Name: Dong Tsai M.D.  Surgery Facility: [facility] ([address] [facility])  Surgery Date: 4/14/2022    The time of your surgery is not final and may change up to and until the day of your surgery. You will be contacted 24-48 hours prior to your surgery date with your check-in and surgery time.    If you will not be at one of the below numbers please call/text the surgery scheduler at [phone]  Preferred Phone: [phone]    BEFORE YOUR SURGERY  Pre Registration and/or Lab Work must be done within and no earlier than 28 days prior to your surgery date. Please call [facility] at [phone] for an appointment as soon as possible.     Pre op Appointment:   Date: 4/06/2022   Time: 11:00am   Provider: Dong Tsai M.D.   Location: [address] [place] [id number]  Instructions: Bring a list of all medications you are taking including the dosing and frequency.    Please refrain from smoking any substance after midnight prior to surgery. Smoking may interfere with the anesthetic and frequently produces nausea during the recovery period.    Continue taking all lifesaving medications. Including the morning of your surgery with small sip of water.    Please read the MEDICATION INSTRUCTIONS below completely.    DAY OF YOUR SURGERY  Nothing to eat or drink after midnight     Please arrive at the hospital/surgery center at the check-in time provided.     An adult will need to bring you and take you home after your surgery.     AFTER YOUR SURGERY  Post op Appointment:   Date: 4/27/2022   Time: 11:00am   With: Dong Tsai M.D.   Location: [address] [place] [address]    - No dental work for 3-6 months after your surgery.  - Post Surgery - You will need someone to drive you home  - Post Surgery - You will need someone to stay with you for 24 hours  - You must have someone provide transportation post surgery and  someone to monitor you for at least 24 hours post-surgery. If you don't have either of these your appointment will be canceled.    TIME OFF WORK  FMLA or Disability forms can be faxed directly to: [phone] or you may drop them off at [address] [place] [address]. Our office charges a $35.00 fee per form. Forms will be completed within 10 business days of drop off and payment received. For the status of your forms you may contact our disability office directly [phone].    MEDICATION INSTRUCTIONS  The following medications should be stopped a minimum of 10 days prior to surgery:  All over the counter, Supplements & Herbal medications    Anorectics: Phentermine (Adipex-P, Lomaira and Suprenza), Phentermine-topiramate (Qsymia), Bupropion-naltrexone (Contrave)    Opiod Partial Agonists/Opioid Antagonists: Buprenorphine (Subocone, Belbuca, Butrans, Probuphine Implant, Sublocade), Naltrexone (ReVia, Vivitrol), Naloxone    Amphetamines: Dextroamphetamine/Amphetamine (Adderall, Mydayis), Methylphenidate Hydrochloride (Concerta, Metadate, Methylin, Ritalin)    The following medications should be stopped 5 days prior to surgery:  Blood Thinners: Any Aspirin, Aspirin products, anti-inflammatories such as ibuprofen and any blood thinners such as Coumadin and Plavix. Please consult your prescribing physician if you are on life saving blood thinners, in regards to when to stop medications prior to surgery.     The following medications should be stopped a minimum of 3 days prior to surgery:  PDE-5 inhibitors: Sildenafil (Viagra), Tadalafil (Cialis), Vardenafil (Levitra), Avanafil (Stendra)    MAO Inhibitors: Rasagiline (Azilect), Selegiline (Eldepryl, Emsam, Selapar), Isocarboxazid (Marplan), Phenelzine (Nardil)

## 2022-04-15 VITALS
TEMPERATURE: 97.8 F | WEIGHT: 149.69 LBS | BODY MASS INDEX: 28.26 KG/M2 | OXYGEN SATURATION: 96 % | DIASTOLIC BLOOD PRESSURE: 58 MMHG | HEART RATE: 73 BPM | HEIGHT: 61 IN | RESPIRATION RATE: 17 BRPM | SYSTOLIC BLOOD PRESSURE: 115 MMHG

## 2022-04-15 PROBLEM — Z96.641 STATUS POST TOTAL REPLACEMENT OF RIGHT HIP: Status: ACTIVE | Noted: 2022-04-15

## 2022-04-15 PROCEDURE — 700111 HCHG RX REV CODE 636 W/ 250 OVERRIDE (IP): Performed by: ORTHOPAEDIC SURGERY

## 2022-04-15 PROCEDURE — 97535 SELF CARE MNGMENT TRAINING: CPT

## 2022-04-15 PROCEDURE — 700102 HCHG RX REV CODE 250 W/ 637 OVERRIDE(OP): Performed by: ORTHOPAEDIC SURGERY

## 2022-04-15 PROCEDURE — 97165 OT EVAL LOW COMPLEX 30 MIN: CPT

## 2022-04-15 PROCEDURE — A9270 NON-COVERED ITEM OR SERVICE: HCPCS | Performed by: ORTHOPAEDIC SURGERY

## 2022-04-15 PROCEDURE — 96376 TX/PRO/DX INJ SAME DRUG ADON: CPT

## 2022-04-15 PROCEDURE — G0378 HOSPITAL OBSERVATION PER HR: HCPCS

## 2022-04-15 PROCEDURE — 97162 PT EVAL MOD COMPLEX 30 MIN: CPT

## 2022-04-15 RX ADMIN — ASPIRIN 81 MG: 81 TABLET, COATED ORAL at 15:38

## 2022-04-15 RX ADMIN — KETOROLAC TROMETHAMINE 15 MG: 30 INJECTION, SOLUTION INTRAMUSCULAR at 04:54

## 2022-04-15 RX ADMIN — ACETAMINOPHEN 1000 MG: 500 TABLET ORAL at 15:38

## 2022-04-15 RX ADMIN — OXYCODONE HYDROCHLORIDE 10 MG: 10 TABLET ORAL at 04:54

## 2022-04-15 RX ADMIN — ONDANSETRON 4 MG: 2 INJECTION INTRAMUSCULAR; INTRAVENOUS at 10:20

## 2022-04-15 RX ADMIN — ACETAMINOPHEN 1000 MG: 500 TABLET ORAL at 09:10

## 2022-04-15 RX ADMIN — ACETAMINOPHEN 1000 MG: 10 INJECTION, SOLUTION INTRAVENOUS at 02:57

## 2022-04-15 RX ADMIN — DOCUSATE SODIUM 100 MG: 100 CAPSULE, LIQUID FILLED ORAL at 04:54

## 2022-04-15 RX ADMIN — OXYCODONE 5 MG: 5 TABLET ORAL at 09:13

## 2022-04-15 RX ADMIN — KETOROLAC TROMETHAMINE 15 MG: 30 INJECTION, SOLUTION INTRAMUSCULAR at 10:26

## 2022-04-15 ASSESSMENT — LIFESTYLE VARIABLES
TOTAL SCORE: 0
EVER HAD A DRINK FIRST THING IN THE MORNING TO STEADY YOUR NERVES TO GET RID OF A HANGOVER: NO
CONSUMPTION TOTAL: NEGATIVE
DOES PATIENT WANT TO STOP DRINKING: NO
EVER FELT BAD OR GUILTY ABOUT YOUR DRINKING: NO
HAVE YOU EVER FELT YOU SHOULD CUT DOWN ON YOUR DRINKING: NO
ALCOHOL_USE: NO
TOTAL SCORE: 0
HOW MANY TIMES IN THE PAST YEAR HAVE YOU HAD 5 OR MORE DRINKS IN A DAY: 0
ON A TYPICAL DAY WHEN YOU DRINK ALCOHOL HOW MANY DRINKS DO YOU HAVE: 0
AVERAGE NUMBER OF DAYS PER WEEK YOU HAVE A DRINK CONTAINING ALCOHOL: 0
TOTAL SCORE: 0
HAVE PEOPLE ANNOYED YOU BY CRITICIZING YOUR DRINKING: NO

## 2022-04-15 ASSESSMENT — COGNITIVE AND FUNCTIONAL STATUS - GENERAL
STANDING UP FROM CHAIR USING ARMS: A LOT
MOVING FROM LYING ON BACK TO SITTING ON SIDE OF FLAT BED: A LITTLE
DAILY ACTIVITIY SCORE: 23
DRESSING REGULAR LOWER BODY CLOTHING: A LITTLE
SUGGESTED CMS G CODE MODIFIER DAILY ACTIVITY: CI
SUGGESTED CMS G CODE MODIFIER MOBILITY: CK
MOVING TO AND FROM BED TO CHAIR: A LITTLE
MOBILITY SCORE: 18
DAILY ACTIVITIY SCORE: 22
TURNING FROM BACK TO SIDE WHILE IN FLAT BAD: A LITTLE
MOBILITY SCORE: 15
STANDING UP FROM CHAIR USING ARMS: A LITTLE
WALKING IN HOSPITAL ROOM: A LITTLE
TURNING FROM BACK TO SIDE WHILE IN FLAT BAD: A LITTLE
HELP NEEDED FOR BATHING: A LITTLE
HELP NEEDED FOR BATHING: A LITTLE
CLIMB 3 TO 5 STEPS WITH RAILING: A LITTLE
SUGGESTED CMS G CODE MODIFIER MOBILITY: CK
SUGGESTED CMS G CODE MODIFIER DAILY ACTIVITY: CJ
MOVING TO AND FROM BED TO CHAIR: A LITTLE
CLIMB 3 TO 5 STEPS WITH RAILING: A LOT
WALKING IN HOSPITAL ROOM: A LOT
MOVING FROM LYING ON BACK TO SITTING ON SIDE OF FLAT BED: A LITTLE

## 2022-04-15 ASSESSMENT — PAIN DESCRIPTION - PAIN TYPE
TYPE: SURGICAL PAIN

## 2022-04-15 ASSESSMENT — GAIT ASSESSMENTS
ASSISTIVE DEVICE: FRONT WHEEL WALKER
GAIT LEVEL OF ASSIST: SUPERVISED
DISTANCE (FEET): 25
DISTANCE (FEET): 250

## 2022-04-15 ASSESSMENT — ACTIVITIES OF DAILY LIVING (ADL): TOILETING: INDEPENDENT

## 2022-04-15 NOTE — THERAPY
"Occupational Therapy   Initial Evaluation     Patient Name: Yas Ortiz  Age:  76 y.o., Sex:  female  Medical Record #: [id number]  Today's Date: 4/15/2022     Precautions: Fall Risk,Anterior Hip Precautions,Weight Bearing As Tolerated Right Lower Extremity    Assessment    Patient is 76 y.o. female with h/o DJD, admitted for elective anterior R FELIPE. Pt seen for OT eval and treatment. Pt able to mobilize in room using FWW with supv. Trained on use of reacher and sock-aid for compensatory LB dressing. Provided printed resource with purchase options. Pt has shower chair and hand-held nozzle in tub/shower at home. Agrees to have supv with transfer. Plans to sleep in recliner as needed for safety. Pt is completing BADL with no more than supv in this setting. Has good support from DIL on DC. No further acute OT needs at this time.     Plan    Recommend Occupational Therapy for Evaluation only    Subjective    \"I'm having trouble remembering things. My daughter-in-law will help me keep track of the meds.\"     Objective       04/15/22 1152   Prior Living Situation   Housing / Facility 1 Story House   Steps Into Home 3   Rail Both Rail (Steps into Home)   Bathroom Set up Bathtub / Shower Combination;Shower Chair   Equipment Owned Hand Held Shower;Tub / Shower Seat;Front-Wheel Walker;4-Wheel Walker   Lives with - Patient's Self Care Capacity Alone and Able to Care For Self   Comments Pt reports DIL with stay with her during initial recover. Has recliner she can sleep in for comfort.   Prior Level of ADL Function   Comments Pt was independent with BADL PTA   Prior Level of IADL Function   Comments Pt was independent with I-ADL and functional mobility PTA; limited community distances   Balance Assessment   Sitting Balance (Static) Good   Sitting Balance (Dynamic) Fair +   Standing Balance (Static) Fair   Standing Balance (Dynamic) Fair   Weight Shift Sitting Good   Weight Shift Standing Fair   Comments FWW for standing "   Bed Mobility    Supine to Sit   (up to chair pre)   Sit to Supine Standby Assist  (Required HOB slightly elevated)   Scooting Supervised  (seated)   ADL Assessment   Grooming Supervision;Standing  (oral care at sink)   Lower Body Dressing Supervision  (doff/don B socks using AE)   Toileting Supervision  (urination on toilet)   Functional Mobility   Sit to Stand Supervised   Bed, Chair, Wheelchair Transfer Supervised   Toilet Transfers Standby Assist   Transfer Method Stand Step  (FWW)   Mobility Short gait and transfers with FWW

## 2022-04-15 NOTE — DISCHARGE INSTRUCTIONS
Discharge Instructions    Discharged to home by car with relative. Discharged via wheelchair, hospital escort: Yes.  Special equipment needed: Not Applicable    Be sure to schedule a follow-up appointment with your primary care doctor or any specialists as instructed.     Discharge Plan:        I understand that a diet low in cholesterol, fat, and sodium is recommended for good health. Unless I have been given specific instructions below for another diet, I accept this instruction as my diet prescription.   Other diet: n/a    Special Instructions: None    · Is patient discharged on Warfarin / Coumadin?   No   Hip Arthroscopy, Care After  This sheet gives you information about how to care for yourself after your procedure. Your health care provider may also give you more specific instructions. If you have problems or questions, contact your health care provider.  What can I expect after the procedure?  After the procedure, it is common to have:  Soreness.  Swelling.  Pain that can be relieved by taking pain medicine.  Follow these instructions at home:  Incision care    Follow instructions from your health care provider about how to take care of your incisions. Make sure you:  Wash your hands with soap and water before you change your bandage (dressing). If soap and water are not available, use hand .  Change your dressing as told by your health care provider.  Leave stitches (sutures), staples, skin glue, or adhesive strips in place. These skin closures may need to stay in place for 2 weeks or longer. If adhesive strip edges start to loosen and curl up, you may trim the loose edges. Do not remove adhesive strips completely unless your health care provider tells you to do that.  Check your incision areas every day for signs of infection. Check for:  Redness.  More swelling or pain.  Fluid or blood.  Warmth.  Pus or a bad smell.  Bathing  Do not take baths, swim, or use a hot tub until your health care  provider approves. Ask your health care provider if you may take showers. You may only be allowed to take sponge baths.  Activity  Do not use your hip or leg to support (bear) your body weight until your health care provider says that you can. Follow weight-bearing restrictions as told. Use crutches or other devices to help you move around (assistive devices) as directed.  Ask your health care provider what activities are safe for you during recovery, and ask what activities you need to avoid.  If physical therapy was prescribed, do exercises as directed. Doing exercises may help to improve hip movement and flexibility (range of motion).  Do not lift anything that is heavier than 10 lb (4.5 kg), or the limit that you are told, until your health care provider says that it is safe.  Driving  Do not drive until your health care provider approves.  Do not drive or use heavy machinery while taking prescription pain medicine.  Managing pain, stiffness, and swelling    If directed, put ice on the painful area.  Put ice in a plastic bag.  Place a towel between your skin and the bag.  Leave the ice on for 20 minutes, 2-3 times a day.  Move your toes often to avoid stiffness and to lessen swelling.  Raise (elevate) your leg above the level of your heart while you are sitting or lying down. Try putting a few pillows under your leg. This will take some of the pressure off of your hip.  If you are taking blood thinners:  Talk with your health care provider before you take any medicines that contain aspirin or NSAIDs. These medicines increase your risk for dangerous bleeding.  Take your medicine exactly as told, at the same time every day.  Avoid activities that could cause injury or bruising, and follow instructions about how to prevent falls.  Wear a medical alert bracelet or carry a card that lists what medicines you take.  General instructions  Take over-the-counter and prescription medicines only as told by your health care  provider.  If you are taking prescription pain medicine, take actions to prevent or treat constipation. Your health care provider may recommend that you:  Drink enough fluid to keep your urine pale yellow.  Eat foods that are high in fiber, such as fresh fruits and vegetables, whole grains, and beans.  Limit foods that are high in fat and processed sugars, such as fried or sweet foods.  Take an over-the-counter or prescription medicine for constipation.  Do not use any products that contain nicotine or tobacco, such as cigarettes and e-cigarettes. These can delay incision or bone healing. If you need help quitting, ask your health care provider.  Wear compression stockings as told by your health care provider. These stockings help to prevent blood clots and reduce swelling in your legs.  Keep all follow-up visits as told by your health care provider. This is important.  Contact a health care provider if you:  Have a fever.  Have severe pain.  Have redness around an incision.  Have more swelling.  Have fluid or blood coming from an incision.  Notice that an incision feels warm to the touch.  Notice pus or a bad smell coming from an incision.  Notice that an incision opens up.  Develop a rash.  Get help right away if:  You have difficulty breathing.  You have shortness of breath.  You have chest pain.  You develop pain in your lower leg or at the back of your knee.  Your leg becomes numb.  Summary  It is common to have soreness, stiffness, and some pain after this procedure.  Take over-the-counter and prescription medicines only as told by your health care provider, including pain medicines.  To help relieve pain and swelling, put ice on your leg for 20 minutes at a time, 2-3 times a day.  If physical therapy was prescribed, do exercises as directed. Doing exercises may help to improve hip movement and flexibility (range of motion).  This information is not intended to replace advice given to you by your health care  provider. Make sure you discuss any questions you have with your health care provider.  Document Released: 01/06/2009 Document Revised: 11/30/2018 Document Reviewed: 11/08/2018  Elsevier Patient Education © 2020 [employer]      Depression / Suicide Risk    As you are discharged from this [facility] facility, it is important to learn how to keep safe from harming yourself.    Recognize the warning signs:  · Abrupt changes in personality, positive or negative- including increase in energy   · Giving away possessions  · Change in eating patterns- significant weight changes-  positive or negative  · Change in sleeping patterns- unable to sleep or sleeping all the time   · Unwillingness or inability to communicate  · Depression  · Unusual sadness, discouragement and loneliness  · Talk of wanting to die  · Neglect of personal appearance   · Rebelliousness- reckless behavior  · Withdrawal from people/activities they love  · Confusion- inability to concentrate     If you or a loved one observes any of these behaviors or has concerns about self-harm, here's what you can do:  · Talk about it- your feelings and reasons for harming yourself  · Remove any means that you might use to hurt yourself (examples: pills, rope, extension cords, firearm)  · Get professional help from the community (Mental Health, Substance Abuse, psychological counseling)  · Do not be alone:Call your Safe Contact- someone whom you trust who will be there for you.  · Call your local CRISIS HOTLINE 844-9420 or 122-398-9642  · Call your local Children's Mobile Crisis Response Team [place] (709) 516-7272 or www.Moka5.com  · Call the toll free National Suicide Prevention Hotlines   · National Suicide Prevention Lifeline 881-128-VNSW (8068)  · National Hope Line Network 800-SUICIDE (295-7687)

## 2022-04-15 NOTE — THERAPY
"Physical Therapy   Initial Evaluation     Patient Name: Yas Ortiz  Age:  76 y.o., Sex:  female  Medical Record #: [id number]  Today's Date: 4/15/2022     Precautions  Precautions: Fall Risk;Anterior Hip Precautions;Weight Bearing As Tolerated Right Lower Extremity    Assessment  Patient is 76 y.o. female s/p R FELIPE, anterolateral approach with anterior hip precautions. PMHx of L3-5 surgery May 2021. Pt required SPV for all mobility including STS, transfers, ambulation 250ft with FWW, and negotiating 4 steps with no LOB. Pt lives alone, but her DIL will be coming to stay with her as long as needed, available 24/7 assist. Unable to assess bed mobility as pt with episode of emesis at end of session, however, per RN pt required SPV earlier this AM and also has a recliner at home if needed. Pt edu on hip precautions and supine/seated therex with handouts provided. Recommend home with OP PT. Patient will not be actively followed for physical therapy services at this time, however may be seen if requested by physician for 1 more visit within 30 days to address any discharge or equipment needs.     Plan    Recommend Physical Therapy for Evaluation only    DC Equipment Recommendations: None (has FWW and 4WW)  Discharge Recommendations: Recommend outpatient physical therapy services to address higher level deficits       Subjective    \"I can put ice on my hip right?\"      Objective       04/15/22 1023   Total Time Spent   Total Time Spent (Mins) 22   Charge Group   PT Evaluation PT Evaluation Mod   PT Self Care / Home Evaluation  1  (10 min edu)   Initial Contact Note    Initial Contact Note Order Received and Verified, Evaluation Only - Patient Does Not Require Further Acute Physical Therapy at this Time.  However, May Benefit from Post Acute Therapy for Higher Level Functional Deficits.   Precautions   Precautions Fall Risk;Anterior Hip Precautions;Weight Bearing As Tolerated Right Lower Extremity   Vitals   O2 Delivery " Device None - Room Air   Vitals Comments VSS   Pain 0 - 10 Group   Therapist Pain Assessment Post Activity Pain Same as Prior to Activity;Nurse Notified  (c/o R hip pain not rated, agreeable to mobility)   Prior Living Situation   Prior Services Home-Independent   Housing / Facility 1 Story House   Steps Into Home 4   Rail Both Rail (Steps in Home)   Bathroom Set up Bathtub / Shower Combination;Shower Chair;Grab Bars   Equipment Owned Front-Wheel Walker;4-Wheel Walker;Tub / Shower Seat   Lives with - Patient's Self Care Capacity Alone and Able to Care For Self   Comments Pt stating DIL coming to stay with pt as long as needed, available 24/7. Has recliner   Prior Level of Functional Mobility   Bed Mobility Independent   Transfer Status Independent   Ambulation Independent   Distance Ambulation (Feet)   (community)   Assistive Devices Used Front-Wheel Walker   Stairs Independent   Comments States as pain progressed has been using scooter around stores   History of Falls   History of Falls No   Cognition    Cognition / Consciousness WDL   Level of Consciousness Alert   Comments Pleasant and cooperative   Active ROM Lower Body    Active ROM Lower Body  X   Comments R hip flexion limited 2/2 pain. Otherwise WDL within anterior precautions   Strength Lower Body   Lower Body Strength  WDL   Comments RLE 3+/5, LLE 4/5   Sensation Lower Body   Lower Extremity Sensation   WDL   Comments denies NT   Strength Upper Body   Upper Body Strength  WDL   Neurological Concerns   Neurological Concerns No   Coordination Upper Body   Coordination WDL   Comments assessed with functional mobility   Coordination Lower Body    Coordination Lower Body  WDL   Comments assessed with functional mobility   Balance Assessment   Sitting Balance (Static) Good   Sitting Balance (Dynamic) Good   Standing Balance (Static) Fair +   Standing Balance (Dynamic) Fair   Weight Shift Sitting Good   Weight Shift Standing Fair   Comments w/ FWW   Gait Analysis    Gait Level Of Assist Supervised   Assistive Device Front Wheel Walker   Distance (Feet) 250   # of Times Distance was Traveled 1   Deviation Bradykinetic;Step To;Decreased Heel Strike  (occasionally step through)   # of Stairs Climbed 4   Level of Assist with Stairs Supervised   Weight Bearing Status RLE WBAT   Comments Required cues to interally rotate RLE and normalize gait as pt used to compensating for pain   Bed Mobility    Comments NT, up in chair pre/post. Unable to attempt bed mobility 2/2 pt with emesis and refusing. Per RN with required SPV for bed mobility. Pt also has recliner at home   Functional Mobility   Sit to Stand Supervised   Bed, Chair, Wheelchair Transfer Supervised   Transfer Method Stand Step   Mobility w/ FWW   How much difficulty does the patient currently have...   Turning over in bed (including adjusting bedclothes, sheets and blankets)? 3   Sitting down on and standing up from a chair with arms (e.g., wheelchair, bedside commode, etc.) 3   Moving from lying on back to sitting on the side of the bed? 3   How much help from another person does the patient currently need...   Moving to and from a bed to a chair (including a wheelchair)? 3   Need to walk in a hospital room? 3   Climbing 3-5 steps with a railing? 3   6 clicks Mobility Score 18   Activity Tolerance   Sitting in Chair >10 min, up pre/post   Sitting Edge of Bed NT   Standing 15 min   Comments limited by pain   Edema / Skin Assessment   Edema / Skin  WDL   Education Group   Education Provided Role of Physical Therapist;Hip Precautions Anterior;Use of Assistive Device;Gait Training;Stair Training;Exercises - Seated;Exercises - Supine;Weight Bearing Status   Hip Precautions Anterior Patient Response Patient;Acceptance;Explanation;Handout;Action Demonstration   Role of Physical Therapist Patient Response Patient;Acceptance;Explanation;Verbal Demonstration   Gait Training Patient Response Patient;Acceptance;Explanation;Action  Demonstration   Stair Training Patient Response Patient;Acceptance;Explanation;Action Demonstration   Use of Assistive Device Patient Response Patient;Acceptance;Explanation;Action Demonstration   Exercises - Supine Patient Response Patient;Acceptance;Explanation;Handout;Action Demonstration   Exercises - Seated Patient Response Patient;Acceptance;Explanation;Handout;Action Demonstration   Weight Bearing Status Patient Response Patient;Acceptance;Explanation;Action Demonstration   Problem List    Problems Pain;Functional Strength Deficit;Impaired Balance;Decreased Activity Tolerance   Anticipated Discharge Equipment and Recommendations   DC Equipment Recommendations None  (has FWW and 4WW)   Discharge Recommendations Recommend outpatient physical therapy services to address higher level deficits   Interdisciplinary Plan of Care Collaboration   IDT Collaboration with  Nursing   Patient Position at End of Therapy Seated;Call Light within Reach;Tray Table within Reach;Phone within Reach  (RN updated)   Collaboration Comments 4/15: PT triston only. D/c needs

## 2022-04-15 NOTE — PROGRESS NOTES
Spiritual Care Note    Patient Information     Patient's Name: Yas Ortiz   MRN: [id number]    YOB: 1945   Age and Gender: 76 y.o. female   Service Area: Ortho-Spine   Room (and Bed): [place] [address]   Ethnicity or Nationality:    Primary Language: English   Jew/Spiritual preference: Congregational   Place of Residence: [place]   Family/Friends/Others Present: Daughter In-Law   Clinical Team Present: Nurse   Code Status: Full Code    Date of Admission: 4/14/2022   Length of Stay: 0 days        Spiritual Care Provider Information:   Dustin Corona for  Intern Reggie Martínez  Title of Spiritual Care Provider: Volunteer   Phone Number: [phone]  E-mail: [email or web address]  Total time : 15 minutes    Spiritual Screen Results:    Gen Nursing  Spiritual Screen  Would you like to receive a visit from our Spiritual Care team or your own Temple or spiritual leader?: Yes     Request Information   Visited With: Patient and family together  Nature of the Visit: Initial,On shift  General Visit: Yes  Referral From/ Origin of Request: Epic nursing    Religous Needs/Values  Jew Needs Visit  Jew Needs: Prayer    Encounter Information  Observations/Symptoms: Thankfulness, Accepting  Interaction/Conversation: Spoke casually with pt and daughter in law. Pt was optimistic about her surgery and recovery and said she was feeling well. No issues or concerns came up.  Assessment: Need  Need: Seeking Spiritual Assistance and Support  Intended Effects: Gail Affirmation,Demonstrate Caring and Concern  Interventions: Compassionate Presence, Active Listening, Prayer  Outcomes: Spiritual Comfort,Hope/Peace/Darlin/Trust/Gratitude/Beauty,Connectedness with the Holy/with God    Notes:

## 2022-04-15 NOTE — PROGRESS NOTES
4 Eyes Skin Assessment Completed by Izabella RN and Chase RN.    Head WDL  Ears WDL  Nose WDL  Mouth WDL  Neck WDL  Breast/Chest WDL  Shoulder Blades WDL  Spine WDL  (R) Arm/Elbow/Hand WDL  (L) Arm/Elbow/Hand WDL  Abdomen WDL  Groin WDL  Scrotum/Coccyx/Buttocks WDL  (R) Leg Swelling and Incision  (L) Leg WDL  (R) Heel/Foot/Toe WDL  (L) Heel/Foot/Toe WDL          Devices In Places Blood Pressure Cuff, Pulse Ox and Oxy Mask      Interventions In Place NC W/Ear Foams, Waffle Overlay, Pillows and Pressure Redistribution Mattress    Possible Skin Injury No    Pictures Uploaded Into Epic N/A  Wound Consult Placed N/A  RN Wound Prevention Protocol Ordered No

## 2022-04-15 NOTE — CARE PLAN
The patient is Stable - Low risk of patient condition declining or worsening    Shift Goals  Clinical Goals: up to chair for all meals  Patient Goals: rest  Family Goals: Comfort    Progress made toward(s) clinical / shift goals:    Problem: Fall Risk  Goal: Patient will remain free from falls  Outcome: Progressing  Note: Safety precautions are in place including bed locked and in lowest position, upper bed rails up, bed alarm on, call light within reach, treaded socks on, tray table and personal belongings within reach.        Patient is not progressing towards the following goals:

## 2022-04-15 NOTE — CARE PLAN
The patient is Stable - Low risk of patient condition declining or worsening    Shift Goals: Pain control, Mobility  Clinical Goals: Pain control, Mobility  Patient Goals: Pain control, Sleep  Family Goals: Comfort    Progress made toward(s) clinical / shift goals:  PRN and scheduled pain medication per MAR. Up to bathroom using FWW. Fall precautions in place.     Patient is not progressing towards the following goals: N/A      Problem: Pain - Standard  Goal: Alleviation of pain or a reduction in pain to the patient’s comfort goal  Outcome: Progressing  Pain assessed using verbal and nonverbal scales. PRN and scheduled pain medication given as needed and as ordered. Education provided on pain management.       Problem: Fall Risk  Goal: Patient will remain free from falls  Outcome: Progressing  Bed in lowest and locked position. Bed alarm in use. Treaded socks on pt. Belongings and call light in reach. Hourly rounding in place. Provided safety education.

## 2022-05-06 NOTE — PROGRESS NOTES
Discharge note   Ok discharge 4-15  Has follow up no questions  Call office for any issues  Eulalio

## 2022-09-02 ENCOUNTER — APPOINTMENT (OUTPATIENT)
Dept: RADIOLOGY | Facility: MEDICAL CENTER | Age: 77
End: 2022-09-02
Attending: FAMILY MEDICINE
Payer: MEDICARE

## 2022-09-20 ENCOUNTER — HOSPITAL ENCOUNTER (OUTPATIENT)
Dept: RADIOLOGY | Facility: MEDICAL CENTER | Age: 77
End: 2022-09-20
Attending: FAMILY MEDICINE
Payer: MEDICARE

## 2022-09-20 DIAGNOSIS — Z12.31 VISIT FOR SCREENING MAMMOGRAM: ICD-10-CM

## 2022-09-20 PROCEDURE — 77063 BREAST TOMOSYNTHESIS BI: CPT | Mod: 52

## (undated) DEVICE — DRAPE SURGICAL U 77X120 - (10/CA)

## (undated) DEVICE — MIDAS LUBRICATOR DIFFUSER PACK (4EA/CA)

## (undated) DEVICE — SUTURE GENERAL

## (undated) DEVICE — KIT EVACUATER 3 SPRING PVC LF 1/8 DRAIN SIZE (10EA/CA)"

## (undated) DEVICE — DRAPE LAPAROTOMY T SHEET - (12EA/CA)

## (undated) DEVICE — SUTURE 5 TI-CRON HOS-14 - (36/BX)

## (undated) DEVICE — SUTURE 1 VICRYL PLUS CT-1 - 18 INCH (12/BX)

## (undated) DEVICE — ARMREST CRADLE FOAM - (2PR/PK 12PR/CA)

## (undated) DEVICE — SET EXTENSION WITH 2 PORTS (48EA/CA) ***PART #2C8610 IS A SUBSTITUTE*****

## (undated) DEVICE — CANISTER SUCTION 3000ML MECHANICAL FILTER AUTO SHUTOFF MEDI-VAC NONSTERILE LF DISP  (40EA/CA)

## (undated) DEVICE — GLOVE BIOGEL PI ORTHO SZ 8.5 PF LF (40/BX)

## (undated) DEVICE — Device

## (undated) DEVICE — GOWN WARMING STANDARD FLEX - (30/CA)

## (undated) DEVICE — SUCTION INSTRUMENT YANKAUER BULBOUS TIP W/O VENT (50EA/CA)

## (undated) DEVICE — PACK NEURO - (2EA/CA)

## (undated) DEVICE — HEAD HOLDER JUNIOR/ADULT

## (undated) DEVICE — HEADREST PRONEVIEW LARGE - (10/CA)

## (undated) DEVICE — GLOVE BIOGEL PI ORTHO SZ 6 1/2 SURGICAL PF LF (40PR/BX)

## (undated) DEVICE — GLOVESZ 8.5 BIOGEL PI MICRO - PF LF (50PR/BX)

## (undated) DEVICE — NEPTUNE 4 PORT MANIFOLD - (20/PK)

## (undated) DEVICE — NEEDLE NON-SAFETY HYPO 21 GA X 1 1/2 IN HYPO (100/BX)

## (undated) DEVICE — SET LEADWIRE 5 LEAD BEDSIDE DISPOSABLE ECG (1SET OF 5/EA)

## (undated) DEVICE — TUBING C&T SET FLYING LEADS DRAIN TUBING (10EA/BX)

## (undated) DEVICE — LENS/HOOD FOR SPACESUIT - (32/PK) PEEL AWAY FACE

## (undated) DEVICE — BLADE SAGITTAL SAW DUAL CUT 75.0 X 25.0MM (1/EA)

## (undated) DEVICE — TOWEL STOP TIMEOUT SAFETY FLAG (40EA/CA)

## (undated) DEVICE — GLOVE SZ 7.5 BIOGEL PI MICRO - PF LF (50PR/BX)

## (undated) DEVICE — TOOL DISSECT MATCH HEAD

## (undated) DEVICE — GLOVE BIOGEL PI INDICATOR SZ 6.5 SURGICAL PF LF - (50/BX 4BX/CA)

## (undated) DEVICE — PROTECTOR ULNA NERVE - (36PR/CA)

## (undated) DEVICE — SODIUM CHL IRRIGATION 0.9% 1000ML (12EA/CA)

## (undated) DEVICE — KIT SURGIFLO W/OUT THROMBIN - (6EA/CA)

## (undated) DEVICE — LACTATED RINGERS INJ 1000 ML - (14EA/CA 60CA/PF)

## (undated) DEVICE — KIT ANESTHESIA W/CIRCUIT & 3/LT BAG W/FILTER (20EA/CA)

## (undated) DEVICE — TUBE E-T HI-LO CUFF 7.0MM (10EA/PK)

## (undated) DEVICE — SYRINGE SAFETY 10 ML 18 GA X 1 1/2 BLUNT LL (100/BX 4BX/CA)

## (undated) DEVICE — LEAD SET 6 DISP. EKG NIHON KOHDEN

## (undated) DEVICE — TIP INTPLS HFLO ML ORFC BTRY - (12/CS)  FOR SURGILAV

## (undated) DEVICE — NEEDLE 20 GA X 1 INCH - NON SAFTEY (100/BX)

## (undated) DEVICE — LIGHT SOURCE MIS 12FT

## (undated) DEVICE — LACTATED RINGERS INJ. 500 ML - (24EA/CA)

## (undated) DEVICE — SPONGE GAUZESTER 4 X 4 4PLY - (128PK/CA)

## (undated) DEVICE — GLOVE PROTEXIS W/NEU-THERA SZ 8.5 (50PR/BX)

## (undated) DEVICE — ELECTRODE 850 FOAM ADHESIVE - HYDROGEL RADIOTRNSPRNT (50/PK)

## (undated) DEVICE — CLOSURE SKIN STRIP 1/2 X 4 IN - (STERI STRIP) (50/BX 4BX/CA)

## (undated) DEVICE — MASK ANESTHESIA ADULT  - (100/CA)

## (undated) DEVICE — KIT ROOM DECONTAMINATION

## (undated) DEVICE — HANDPIECE 10FT INTPLS SCT PLS IRRIGATION HAND CONTROL SET (6/PK)

## (undated) DEVICE — SUTURE 3-0 MONOCRYL PLUS PS-1 - 27 INCH (36/BX)

## (undated) DEVICE — BOVIE  BLADE 6 EXTENDED - (50/PK)

## (undated) DEVICE — SLEEVE, VASO, THIGH, MED

## (undated) DEVICE — SUTURE 2-0 VICRYL PLUS CT-1 - 8 X 18 INCH(12/BX)

## (undated) DEVICE — SUTURE 2-0 VICRYL PLUS CTX - 8 X 18 INCH (12/BX)

## (undated) DEVICE — SODIUM CHL. IRRIGATION 0.9% 3000ML (4EA/CA 65CA/PF)

## (undated) DEVICE — SYRINGE DISP. 60 CC LL - (30/BX, 12BX/CA)**WHEN THESE ARE GONE ORDER #500206**

## (undated) DEVICE — DRAPE STRLE REG TOWEL 18X24 - (10/BX 4BX/CA)"

## (undated) DEVICE — DRAPE MICROSCOPE X-LONG (10EA/CA)

## (undated) DEVICE — SYRINGE 30 ML LL (56/BX)

## (undated) DEVICE — CATHETER EPIDURAL PORTEX (10/BX) ***DISCONTINUED LOOKING INTO SUB***

## (undated) DEVICE — TUBING CLEARLINK DUO-VENT - C-FLO (48EA/CA)

## (undated) DEVICE — CHLORAPREP 26 ML APPLICATOR - ORANGE TINT(25/CA)

## (undated) DEVICE — DRAIN JACKSON PRATT 10FR - (10/CS)

## (undated) DEVICE — ELECTRODE DUAL RETURN W/ CORD - (50/PK)

## (undated) DEVICE — GOWN SURGEONS LARGE - (32/CA)

## (undated) DEVICE — SENSOR SPO2 NEO LNCS ADHESIVE (20/BX) SEE USER NOTES

## (undated) DEVICE — SUTURE 1 VICRYL PLUS CTX - 8 X 18 INCH (12/BX)

## (undated) DEVICE — PACK JACKSON TABLE KIT W/OUT - HR (6EA/CA)

## (undated) DEVICE — SYRINGE NON SAFETY 10 CC 20 GA X 1-1/2 IN (100/BX 4BX/CA)

## (undated) DEVICE — RESERVOIR SUCTION 100 CC - SILICONE (20EA/CA)

## (undated) DEVICE — STAPLER SKIN DISP - (6/BX 10BX/CA) VISISTAT

## (undated) DEVICE — GLOVE BIOGEL PI INDICATOR SZ 7.0 SURGICAL PF LF - (50/BX 4BX/CA)

## (undated) DEVICE — PACK TOTAL HIP - (1/CA)

## (undated) DEVICE — STERI STRIP COMPOUND BENZOIN - TINCTURE 0.6ML WITH APPLICATOR (40EA/BX)

## (undated) DEVICE — PAD LAP STERILE 18 X 18 - (5/PK 40PK/CA)

## (undated) DEVICE — WRAP COBAN SELF-ADHERENT 6 IN X  5YDS STERILE TAN (12/CA)

## (undated) DEVICE — DRAPE SURG STERI-DRAPE 7X11OD - (40EA/CA)

## (undated) DEVICE — DRESSING POST OP BORDER 4 X 10 (5EA/BX)

## (undated) DEVICE — GLOVE BIOGEL PI ORTHO SZ 8 PF LF (40PR/BX)